# Patient Record
Sex: FEMALE | Race: WHITE | Employment: OTHER | URBAN - METROPOLITAN AREA
[De-identification: names, ages, dates, MRNs, and addresses within clinical notes are randomized per-mention and may not be internally consistent; named-entity substitution may affect disease eponyms.]

---

## 2017-06-21 ENCOUNTER — HOSPITAL ENCOUNTER (OUTPATIENT)
Dept: MAMMOGRAPHY | Age: 67
Discharge: HOME | End: 2017-06-21
Attending: OBSTETRICS & GYNECOLOGY
Payer: COMMERCIAL

## 2017-06-21 DIAGNOSIS — Z12.39 SCREENING FOR MALIGNANT NEOPLASM OF BREAST: ICD-10-CM

## 2017-06-21 PROCEDURE — 77067 SCR MAMMO BI INCL CAD: CPT

## 2019-01-23 ENCOUNTER — HOSPITAL ENCOUNTER (OUTPATIENT)
Dept: MAMMOGRAPHY | Age: 69
Discharge: HOME | End: 2019-01-23
Attending: FAMILY MEDICINE
Payer: MEDICARE

## 2019-01-23 DIAGNOSIS — Z12.31 SCREENING MAMMOGRAM, ENCOUNTER FOR: ICD-10-CM

## 2019-01-23 PROCEDURE — 77067 SCR MAMMO BI INCL CAD: CPT

## 2019-06-14 ENCOUNTER — HOSPITAL ENCOUNTER (OUTPATIENT)
Dept: GENERAL RADIOLOGY | Age: 69
Discharge: HOME | End: 2019-06-14
Payer: MEDICARE

## 2019-06-14 DIAGNOSIS — M54.2 NECK PAIN: ICD-10-CM

## 2019-06-14 PROCEDURE — 72050 X-RAY EXAM NECK SPINE 4/5VWS: CPT

## 2020-03-19 ENCOUNTER — HOSPITAL ENCOUNTER (OUTPATIENT)
Dept: MAMMOGRAPHY | Age: 70
Discharge: HOME | End: 2020-03-19
Attending: INTERNAL MEDICINE
Payer: MEDICARE

## 2020-03-19 DIAGNOSIS — Z12.31 SCREENING MAMMOGRAM, ENCOUNTER FOR: ICD-10-CM

## 2020-03-19 PROCEDURE — 77067 SCR MAMMO BI INCL CAD: CPT

## 2021-04-25 ENCOUNTER — HOSPITAL ENCOUNTER (EMERGENCY)
Age: 71
Discharge: HOME | End: 2021-04-25
Attending: EMERGENCY MEDICINE
Payer: MEDICARE

## 2021-04-25 VITALS
HEIGHT: 61 IN | WEIGHT: 129 LBS | SYSTOLIC BLOOD PRESSURE: 154 MMHG | DIASTOLIC BLOOD PRESSURE: 76 MMHG | RESPIRATION RATE: 16 BRPM | BODY MASS INDEX: 24.35 KG/M2 | HEART RATE: 77 BPM | OXYGEN SATURATION: 100 % | TEMPERATURE: 98.1 F

## 2021-04-25 DIAGNOSIS — B34.9 VIRAL ILLNESS: Primary | ICD-10-CM

## 2021-04-25 LAB
ALBUMIN SERPL-MCNC: 3.4 G/DL (ref 3.2–4.6)
ALBUMIN/GLOB SERPL: 0.9 {RATIO} (ref 1.2–3.5)
ALP SERPL-CCNC: 82 U/L (ref 50–130)
ALT SERPL-CCNC: 15 U/L (ref 12–65)
ANION GAP SERPL CALC-SCNC: 4 MMOL/L (ref 7–16)
AST SERPL-CCNC: 14 U/L (ref 15–37)
BASOPHILS # BLD: 0 K/UL (ref 0–0.2)
BASOPHILS NFR BLD: 1 % (ref 0–2)
BILIRUB SERPL-MCNC: 0.3 MG/DL (ref 0.2–1.1)
BUN SERPL-MCNC: 15 MG/DL (ref 8–23)
CALCIUM SERPL-MCNC: 8.2 MG/DL (ref 8.3–10.4)
CHLORIDE SERPL-SCNC: 109 MMOL/L (ref 98–107)
CO2 SERPL-SCNC: 29 MMOL/L (ref 21–32)
COVID-19 RAPID TEST, COVR: NOT DETECTED
CREAT SERPL-MCNC: 0.64 MG/DL (ref 0.6–1)
DIFFERENTIAL METHOD BLD: ABNORMAL
EOSINOPHIL # BLD: 0.1 K/UL (ref 0–0.8)
EOSINOPHIL NFR BLD: 2 % (ref 0.5–7.8)
ERYTHROCYTE [DISTWIDTH] IN BLOOD BY AUTOMATED COUNT: 12.3 % (ref 11.9–14.6)
GLOBULIN SER CALC-MCNC: 3.6 G/DL (ref 2.3–3.5)
GLUCOSE SERPL-MCNC: 88 MG/DL (ref 65–100)
HCT VFR BLD AUTO: 41.4 % (ref 35.8–46.3)
HGB BLD-MCNC: 13.9 G/DL (ref 11.7–15.4)
IMM GRANULOCYTES # BLD AUTO: 0 K/UL (ref 0–0.5)
IMM GRANULOCYTES NFR BLD AUTO: 0 % (ref 0–5)
LYMPHOCYTES # BLD: 1 K/UL (ref 0.5–4.6)
LYMPHOCYTES NFR BLD: 34 % (ref 13–44)
MCH RBC QN AUTO: 31.3 PG (ref 26.1–32.9)
MCHC RBC AUTO-ENTMCNC: 33.6 G/DL (ref 31.4–35)
MCV RBC AUTO: 93.2 FL (ref 79.6–97.8)
MONOCYTES # BLD: 0.3 K/UL (ref 0.1–1.3)
MONOCYTES NFR BLD: 10 % (ref 4–12)
NEUTS SEG # BLD: 1.6 K/UL (ref 1.7–8.2)
NEUTS SEG NFR BLD: 52 % (ref 43–78)
NRBC # BLD: 0 K/UL (ref 0–0.2)
PLATELET # BLD AUTO: 227 K/UL (ref 150–450)
PMV BLD AUTO: 9.8 FL (ref 9.4–12.3)
POTASSIUM SERPL-SCNC: 3.6 MMOL/L (ref 3.5–5.1)
PROT SERPL-MCNC: 7 G/DL (ref 6.3–8.2)
RBC # BLD AUTO: 4.44 M/UL (ref 4.05–5.2)
SARS-COV-2, COV2: NORMAL
SODIUM SERPL-SCNC: 142 MMOL/L (ref 136–145)
SOURCE, COVRS: NORMAL
TSH SERPL DL<=0.005 MIU/L-ACNC: 4.98 UIU/ML
WBC # BLD AUTO: 3 K/UL (ref 4.3–11.1)

## 2021-04-25 PROCEDURE — U0005 INFEC AGEN DETEC AMPLI PROBE: HCPCS

## 2021-04-25 PROCEDURE — 84443 ASSAY THYROID STIM HORMONE: CPT

## 2021-04-25 PROCEDURE — 87635 SARS-COV-2 COVID-19 AMP PRB: CPT

## 2021-04-25 PROCEDURE — 80053 COMPREHEN METABOLIC PANEL: CPT

## 2021-04-25 PROCEDURE — 99282 EMERGENCY DEPT VISIT SF MDM: CPT

## 2021-04-25 PROCEDURE — 85025 COMPLETE CBC W/AUTO DIFF WBC: CPT

## 2021-04-25 NOTE — DISCHARGE INSTRUCTIONS
Use over-the-counter medicines for symptomatic relief, push fluids, Tylenol Motrin for any fever or body aches, see your primary care physician for routine recheck return to ER for any concerning conditions

## 2021-04-25 NOTE — ED PROVIDER NOTES
Patient to ER complaint of fatigue for the last 2 days. Denies any vomiting no cough or shortness of breath, she has had some mild nausea, no diarrhea constipation, has been has had similar symptoms for the last several days and he has tested negative for Covid. She did not take Covid injection    The history is provided by the patient. Fatigue  This is a new problem. The current episode started 2 days ago. The problem has not changed since onset. There was no focality noted. Pertinent negatives include no focal weakness, no loss of balance, no slurred speech, no speech difficulty, no movement disorder and no disorientation. There has been no fever. Associated symptoms include nausea. Pertinent negatives include no vomiting, no altered mental status and no confusion. There were no medications administered prior to arrival. Associated medical issues do not include trauma or dementia. Past Medical History:   Diagnosis Date    Arthritis     osteo    Fibromyalgia     GERD (gastroesophageal reflux disease)     controlled w/med    Postoperative stiffness of total knee replacement (HCC)     left    S/P total knee replacement using cement 8/15/2013    Thyroid disease     hypothroid. stable w/med       Past Surgical History:   Procedure Laterality Date    HX COLONOSCOPY  9/2015    HX HYSTERECTOMY  1996    HX KNEE REPLACEMENT  2003    partial right knee    HX KNEE REPLACEMENT  2006    complete Right TKA    HX KNEE REPLACEMENT  8/2013    left    HX ORTHOPAEDIC Left 2013    knee replacement    HX ORTHOPAEDIC Right     knee replacement         Family History:   Problem Relation Age of Onset    Breast Cancer Mother 79    Breast Cancer Paternal Aunt 39    Other Sister         fibromyalgia    Heart Disease Brother     Malignant Hyperthermia Neg Hx     Pseudocholinesterase Deficiency Neg Hx     Delayed Awakening Neg Hx     Post-op Nausea/Vomiting Neg Hx     Emergence Delirium Neg Hx     Post-op Cognitive Dysfunction Neg Hx        Social History     Socioeconomic History    Marital status:      Spouse name: Not on file    Number of children: Not on file    Years of education: Not on file    Highest education level: Not on file   Occupational History    Not on file   Social Needs    Financial resource strain: Not on file    Food insecurity     Worry: Not on file     Inability: Not on file    Transportation needs     Medical: Not on file     Non-medical: Not on file   Tobacco Use    Smoking status: Never Smoker    Smokeless tobacco: Never Used   Substance and Sexual Activity    Alcohol use: No     Alcohol/week: 0.0 standard drinks    Drug use: No    Sexual activity: Not on file   Lifestyle    Physical activity     Days per week: Not on file     Minutes per session: Not on file    Stress: Not on file   Relationships    Social connections     Talks on phone: Not on file     Gets together: Not on file     Attends Cheondoism service: Not on file     Active member of club or organization: Not on file     Attends meetings of clubs or organizations: Not on file     Relationship status: Not on file    Intimate partner violence     Fear of current or ex partner: Not on file     Emotionally abused: Not on file     Physically abused: Not on file     Forced sexual activity: Not on file   Other Topics Concern    Not on file   Social History Narrative    Not on file         ALLERGIES: Patient has no known allergies. Review of Systems   Constitutional: Positive for fatigue. Gastrointestinal: Positive for nausea. Negative for vomiting. Neurological: Negative for focal weakness, speech difficulty and loss of balance. Psychiatric/Behavioral: Negative for confusion. All other systems reviewed and are negative.       Vitals:    04/25/21 1133   BP: (!) 147/84   Pulse: 77   Resp: 16   Temp: 98.1 °F (36.7 °C)   SpO2: 98%   Weight: 58.5 kg (129 lb)   Height: 5' 1\" (1.549 m)            Physical Exam  Vitals signs and nursing note reviewed. Constitutional:       General: She is not in acute distress. Appearance: Normal appearance. She is well-developed and normal weight. She is not diaphoretic. HENT:      Head: Normocephalic and atraumatic. Right Ear: Tympanic membrane normal.      Left Ear: Tympanic membrane normal.      Nose: Nose normal.   Eyes:      Pupils: Pupils are equal, round, and reactive to light. Neck:      Musculoskeletal: Normal range of motion and neck supple. Cardiovascular:      Rate and Rhythm: Normal rate and regular rhythm. Pulmonary:      Effort: Pulmonary effort is normal.      Breath sounds: Normal breath sounds. No wheezing or rhonchi. Abdominal:      General: Abdomen is flat. Bowel sounds are normal.      Palpations: Abdomen is soft. Tenderness: There is no abdominal tenderness. Hernia: No hernia is present. Musculoskeletal: Normal range of motion. Skin:     General: Skin is warm. Neurological:      Mental Status: She is alert and oriented to person, place, and time. Psychiatric:         Mood and Affect: Mood normal.         Behavior: Behavior normal.          MDM  Number of Diagnoses or Management Options  Diagnosis management comments: Cbc, cmp, normal, Covid negative, feel illness may be viral in nature especially since  have similar symptoms and has also tested negative for Covid.   Patient is to use over-the-counter medicines for symptomatic relief see primary care for routine recheck       Amount and/or Complexity of Data Reviewed  Clinical lab tests: ordered and reviewed    Risk of Complications, Morbidity, and/or Mortality  Presenting problems: low  Diagnostic procedures: low  Management options: low    Patient Progress  Patient progress: improved         Procedures

## 2021-04-25 NOTE — ED TRIAGE NOTES
Pt states she has been feeling weak with a headache and pain in eyes since yesterday. Denies vomiting or a cough. States she does have some nausea. Masked for triage.

## 2021-04-25 NOTE — ED NOTES
I have reviewed discharge instructions with the patient. The patient verbalized understanding. Patient left ED via Discharge Method: ambulatory to Home with (family). Opportunity for questions and clarification provided. Patient given 0 scripts. No esign       To continue your aftercare when you leave the hospital, you may receive an automated call from our care team to check in on how you are doing. This is a free service and part of our promise to provide the best care and service to meet your aftercare needs.  If you have questions, or wish to unsubscribe from this service please call [phone]. Thank you for Choosing our [address] [place] [address] Emergency Department.

## 2021-04-26 LAB
SARS-COV-2, COV2: NOT DETECTED
SPECIMEN SOURCE, FCOV2M: NORMAL

## 2021-05-29 ENCOUNTER — HOSPITAL ENCOUNTER (OUTPATIENT)
Dept: MRI IMAGING | Age: 71
Discharge: HOME | End: 2021-05-29
Attending: FAMILY MEDICINE
Payer: MEDICARE

## 2021-05-29 DIAGNOSIS — R41.3 MEMORY DEFICIT: ICD-10-CM

## 2021-05-29 PROCEDURE — A9576 INJ PROHANCE MULTIPACK: HCPCS | Performed by: FAMILY MEDICINE

## 2021-05-29 PROCEDURE — 70553 MRI BRAIN STEM W/O & W/DYE: CPT

## 2021-05-29 PROCEDURE — 74011636320 HC RX REV CODE- 636/320: Performed by: FAMILY MEDICINE

## 2021-05-29 RX ORDER — SODIUM CHLORIDE 0.9 % (FLUSH) 0.9 %
10 SYRINGE (ML) INJECTION
Status: COMPLETED | OUTPATIENT
Start: 2021-05-29 | End: 2021-05-29

## 2021-05-29 RX ADMIN — GADOTERIDOL 13 ML: 279.3 INJECTION, SOLUTION INTRAVENOUS at 08:22

## 2021-05-29 RX ADMIN — Medication 10 ML: at 08:22

## 2022-02-03 ENCOUNTER — NURSE TRIAGE (OUTPATIENT)
Dept: OTHER | Facility: CLINIC | Age: 72
End: 2022-02-03

## 2022-02-03 NOTE — TELEPHONE ENCOUNTER
Received call from [facility] at [facility] with [employer]. Subjective: Caller states L breast swelling inside edge  Cyst aspirated in the past    Current Symptoms:   New med started in Dec 2020 - memantine 10mg  Swelling noted on inner L breast on edge  Denies discoloration or discharge from nipple  Denies breast ca hx    Onset: 1 month ago; gradual onset    Associated Symptoms: NA    Pain Severity: no pain    Temperature: 98.1 by forehead thermometer    What has been tried: NA    LMP: NA Pregnant: NA    Recommended disposition: Be seen in office in 3 days    Care advice provided, patient verbalizes understanding; denies any other questions or concerns; instructed to call back for any new or worsening symptoms. Writer provided warm transfer to [place] at [facility] for appointment scheduling    Attention Provider: Thank you for allowing me to participate in the care of your patient. The patient was connected to triage in response to information provided to the ECC. Please do not respond through this encounter as the response is not directed to a shared pool.     Reason for Disposition   Change in shape or appearance of breast    Protocols used: BREAST SYMPTOMS-ADULT-[place]

## 2022-03-03 ENCOUNTER — HOSPITAL ENCOUNTER (OUTPATIENT)
Dept: MAMMOGRAPHY | Age: 72
Discharge: HOME | End: 2022-03-03
Attending: FAMILY MEDICINE
Payer: MEDICARE

## 2022-03-03 ENCOUNTER — HOSPITAL ENCOUNTER (OUTPATIENT)
Dept: MAMMOGRAPHY | Age: 72
Discharge: HOME | End: 2022-03-03
Attending: NURSE PRACTITIONER
Payer: MEDICARE

## 2022-03-03 DIAGNOSIS — Z80.3 FAMILY HISTORY OF BREAST CANCER: ICD-10-CM

## 2022-03-03 DIAGNOSIS — N63.0 BREAST SWELLING: ICD-10-CM

## 2022-03-03 PROCEDURE — 76642 ULTRASOUND BREAST LIMITED: CPT

## 2022-03-03 PROCEDURE — 77066 DX MAMMO INCL CAD BI: CPT

## 2022-07-03 ENCOUNTER — HOSPITAL ENCOUNTER (EMERGENCY)
Age: 72
Discharge: HOME | End: 2022-07-03
Attending: EMERGENCY MEDICINE
Payer: MEDICARE

## 2022-07-03 ENCOUNTER — HOSPITAL ENCOUNTER (EMERGENCY)
Dept: GENERAL RADIOLOGY | Age: 72
Discharge: HOME | End: 2022-07-06
Payer: MEDICARE

## 2022-07-03 VITALS
BODY MASS INDEX: 26.43 KG/M2 | HEIGHT: 61 IN | OXYGEN SATURATION: 97 % | HEART RATE: 86 BPM | SYSTOLIC BLOOD PRESSURE: 156 MMHG | TEMPERATURE: 98.6 F | DIASTOLIC BLOOD PRESSURE: 88 MMHG | WEIGHT: 140 LBS | RESPIRATION RATE: 16 BRPM

## 2022-07-03 DIAGNOSIS — U07.1 COVID-19 VIRUS INFECTION: Primary | ICD-10-CM

## 2022-07-03 LAB
ALBUMIN SERPL-MCNC: 3.4 G/DL (ref 3.2–4.6)
ALBUMIN/GLOB SERPL: 0.8 {RATIO} (ref 1.2–3.5)
ALP SERPL-CCNC: 79 U/L (ref 50–130)
ALT SERPL-CCNC: 22 U/L (ref 12–65)
ANION GAP SERPL CALC-SCNC: 10 MMOL/L (ref 7–16)
AST SERPL-CCNC: 18 U/L (ref 15–37)
BASOPHILS # BLD: 0 K/UL (ref 0–0.2)
BASOPHILS NFR BLD: 0 % (ref 0–2)
BILIRUB SERPL-MCNC: 0.4 MG/DL (ref 0.2–1.1)
BUN SERPL-MCNC: 20 MG/DL (ref 8–23)
CALCIUM SERPL-MCNC: 9.2 MG/DL (ref 8.3–10.4)
CHLORIDE SERPL-SCNC: 105 MMOL/L (ref 98–107)
CO2 SERPL-SCNC: 23 MMOL/L (ref 21–32)
CREAT SERPL-MCNC: 0.83 MG/DL (ref 0.6–1)
DIFFERENTIAL METHOD BLD: ABNORMAL
EOSINOPHIL # BLD: 0 K/UL (ref 0–0.8)
EOSINOPHIL NFR BLD: 0 % (ref 0.5–7.8)
ERYTHROCYTE [DISTWIDTH] IN BLOOD BY AUTOMATED COUNT: 13.2 % (ref 11.9–14.6)
GLOBULIN SER CALC-MCNC: 4.2 G/DL (ref 2.3–3.5)
GLUCOSE SERPL-MCNC: 127 MG/DL (ref 65–100)
HCT VFR BLD AUTO: 43 % (ref 35.8–46.3)
HGB BLD-MCNC: 14.8 G/DL (ref 11.7–15.4)
IMM GRANULOCYTES # BLD AUTO: 0 K/UL (ref 0–0.5)
IMM GRANULOCYTES NFR BLD AUTO: 0 % (ref 0–5)
LYMPHOCYTES # BLD: 1.4 K/UL (ref 0.5–4.6)
LYMPHOCYTES NFR BLD: 26 % (ref 13–44)
MCH RBC QN AUTO: 30.8 PG (ref 26.1–32.9)
MCHC RBC AUTO-ENTMCNC: 34.4 G/DL (ref 31.4–35)
MCV RBC AUTO: 89.6 FL (ref 79.6–97.8)
MONOCYTES # BLD: 0.6 K/UL (ref 0.1–1.3)
MONOCYTES NFR BLD: 12 % (ref 4–12)
NEUTS SEG # BLD: 3.3 K/UL (ref 1.7–8.2)
NEUTS SEG NFR BLD: 62 % (ref 43–78)
NRBC # BLD: 0 K/UL (ref 0–0.2)
PLATELET # BLD AUTO: 290 K/UL (ref 150–450)
PMV BLD AUTO: 9.8 FL (ref 9.4–12.3)
POTASSIUM SERPL-SCNC: 3.1 MMOL/L (ref 3.5–5.1)
PROT SERPL-MCNC: 7.6 G/DL (ref 6.3–8.2)
RBC # BLD AUTO: 4.8 M/UL (ref 4.05–5.2)
SODIUM SERPL-SCNC: 138 MMOL/L (ref 136–145)
WBC # BLD AUTO: 5.3 K/UL (ref 4.3–11.1)

## 2022-07-03 PROCEDURE — 80053 COMPREHEN METABOLIC PANEL: CPT

## 2022-07-03 PROCEDURE — 85025 COMPLETE CBC W/AUTO DIFF WBC: CPT

## 2022-07-03 PROCEDURE — 71046 X-RAY EXAM CHEST 2 VIEWS: CPT

## 2022-07-03 PROCEDURE — 99284 EMERGENCY DEPT VISIT MOD MDM: CPT

## 2022-07-03 ASSESSMENT — ENCOUNTER SYMPTOMS
SHORTNESS OF BREATH: 1
BACK PAIN: 0
NAUSEA: 0
SPUTUM PRODUCTION: 0
VOICE CHANGE: 0
EYE REDNESS: 0
VOMITING: 0
ABDOMINAL PAIN: 0
COUGH: 1
COLOR CHANGE: 0

## 2022-07-03 ASSESSMENT — PAIN - FUNCTIONAL ASSESSMENT: PAIN_FUNCTIONAL_ASSESSMENT: 0-10

## 2022-07-03 ASSESSMENT — PAIN DESCRIPTION - LOCATION: LOCATION: CHEST

## 2022-07-03 ASSESSMENT — PAIN SCALES - GENERAL: PAINLEVEL_OUTOF10: 5

## 2022-07-04 NOTE — ED TRIAGE NOTES
Pt had a positive home Covid test on Wednesday. Pt stating she is having SOB and intermittent chest pain with cough.

## 2022-07-04 NOTE — ED PROVIDER NOTES
[facility] Emergency Department Provider Note                   PCP:                Mitesh Goodwin MD               Age: 73 y.o. Sex: female     No diagnosis found. DISPOSITION         New Prescriptions    No medications on file       Orders Placed This Encounter   Procedures    XR CHEST (2 VW)    CBC with Auto Differential    Comprehensive Metabolic Panel    Insert peripheral IV        Yasmany Worrell MD 10:34 PM      MDM  Number of Diagnoses or Management Options  Diagnosis management comments: Patient is well-appearing here. Not hypoxic. Plan for screening labs and chest x-ray. 10:37 PM  Chest x-ray is clear. Patient's oxygen saturations are 96 to 97% on room air. Discussed results of testing with patient's  at bedside. I feel she stable for discharge and outpatient follow-up       Amount and/or Complexity of Data Reviewed  Clinical lab tests: ordered and reviewed  Tests in the radiology section of CPT®: ordered and reviewed    Risk of Complications, Morbidity, and/or Mortality  Presenting problems: moderate  Diagnostic procedures: moderate  Management options: moderate         Chester Romero is a 73 y.o. female who presents to the Emergency Department with chief complaint of    Chief Complaint   Patient presents with    Shortness of Breath    Positive For Covid-19      Patient presents the ER with  with concerns about intermittent episodes of shortness of breath and some chest discomfort. Patient does have some dementia.  reports she was diagnosed with COVID on Wednesday. Has had some cough. States the past couple days she has had complaints of some intermittent shortness of breath and pain in her chest with coughing. Denies any vomiting. States fevers have resolved. The history is provided by the patient and the spouse.    Shortness of Breath  Severity:  Mild  Timing:  Intermittent  Chronicity:  New  Relieved by:  Nothing  Worsened by: Coughing  Associated symptoms: cough    Associated symptoms: no abdominal pain, no chest pain, no neck pain, no sputum production and no vomiting          Review of Systems   Constitutional: Negative for fatigue and unexpected weight change. HENT: Negative for congestion, dental problem and voice change. Eyes: Negative for redness and visual disturbance. Respiratory: Positive for cough and shortness of breath. Negative for sputum production. Cardiovascular: Negative for chest pain and palpitations. Gastrointestinal: Negative for abdominal pain, nausea and vomiting. Endocrine: Negative for polyphagia and polyuria. Genitourinary: Negative for flank pain and frequency. Musculoskeletal: Negative for back pain, gait problem, myalgias and neck pain. Skin: Negative for color change and pallor. Neurological: Negative for tremors, syncope, weakness, light-headedness and numbness. Hematological: Negative for adenopathy. Does not bruise/bleed easily. Psychiatric/Behavioral: Negative for behavioral problems and confusion. All other systems reviewed and are negative. Past Medical History:   Diagnosis Date    Arthritis     osteo    Fibromyalgia     GERD (gastroesophageal reflux disease)     controlled w/med    Postoperative stiffness of total knee replacement (HCC)     left    S/P total knee replacement using cement 8/15/2013    Thyroid disease     hypothroid. stable w/med        Past Surgical History:   Procedure Laterality Date    COLONOSCOPY  9/2015    HYSTERECTOMY  1996    ORTHOPEDIC SURGERY Right     knee replacement    ORTHOPEDIC SURGERY Left 2013    knee replacement    TOTAL KNEE ARTHROPLASTY  8/2013    left    TOTAL KNEE ARTHROPLASTY  2006    complete Right TKA    TOTAL KNEE ARTHROPLASTY  2003    partial right knee        Family History   Problem Relation Age of Onset    Breast Cancer Mother 79    Breast Cancer Paternal Aunt 39    Post-op Cognitive Dysfunction Neg Hx     Emergence Delirium Neg Hx     Post-op Nausea/Vomiting Neg Hx     Delayed Awakening Neg Hx     Pseudochol. Deficiency Neg Hx     Malig Hypertherm Neg Hx     Other Sister         fibromyalgia    Heart Disease Brother            Social Connections:     Frequency of Communication with Friends and Family: Not on file    Frequency of Social Gatherings with Friends and Family: Not on file    Attends Orthodox Services: Not on file    Active Member of Clubs or Organizations: Not on file    Attends Club or Organization Meetings: Not on file    Marital Status: Not on file        No Known Allergies     Vitals signs and nursing note reviewed. Patient Vitals for the past 4 hrs:   Temp Pulse Resp BP SpO2   07/03/22 2033 98.8 °F (37.1 °C) 93 20 131/79 94 %          Physical Exam  Vitals and nursing note reviewed. Constitutional:       General: She is not in acute distress. Appearance: Normal appearance. She is not ill-appearing. HENT:      Head: Normocephalic and atraumatic. Right Ear: External ear normal.      Left Ear: External ear normal.      Nose: Nose normal. No congestion or rhinorrhea. Mouth/Throat:      Mouth: Mucous membranes are moist.      Pharynx: No oropharyngeal exudate or posterior oropharyngeal erythema. Eyes:      General:         Right eye: No discharge. Left eye: No discharge. Extraocular Movements: Extraocular movements intact. Pupils: Pupils are equal, round, and reactive to light. Cardiovascular:      Rate and Rhythm: Normal rate and regular rhythm. Pulses: Normal pulses. Heart sounds: Normal heart sounds. Pulmonary:      Effort: Pulmonary effort is normal.      Breath sounds: Normal breath sounds. No rales. Chest:      Chest wall: No tenderness. Abdominal:      General: Abdomen is flat. There is no distension. Palpations: There is no mass. Hernia: No hernia is present.    Musculoskeletal:         General: No swelling or tenderness. Normal range of motion. Cervical back: Normal range of motion and neck supple. Skin:     General: Skin is warm. Capillary Refill: Capillary refill takes less than 2 seconds. Coloration: Skin is not jaundiced or pale. Findings: No bruising or erythema. Neurological:      General: No focal deficit present. Mental Status: She is alert and oriented to person, place, and time. Cranial Nerves: No cranial nerve deficit. Psychiatric:         Mood and Affect: Mood normal.         Behavior: Behavior normal.          Procedures      Labs Reviewed   CBC WITH AUTO DIFFERENTIAL - Abnormal; Notable for the following components:       Result Value    Eosinophils % 0 (*)     All other components within normal limits   COMPREHENSIVE METABOLIC PANEL - Abnormal; Notable for the following components:    Potassium 3.1 (*)     Glucose 127 (*)     Globulin 4.2 (*)     Albumin/Globulin Ratio 0.8 (*)     All other components within normal limits        XR CHEST (2 VW)   Final Result      1. No radiographic evidence of pneumonia or pulmonary edema. Voice dictation software was used during the making of this note. This software is not perfect and grammatical and other typographical errors may be present. This note has not been completely proofread for errors.      Isabel Pacheco MD  07/03/22 6574

## 2022-07-05 ENCOUNTER — CARE COORDINATION (OUTPATIENT)
Dept: CARE COORDINATION | Facility: CLINIC | Age: 72
End: 2022-07-05

## 2022-07-05 NOTE — CARE COORDINATION
Patient contacted regarding COVID-19 diagnosis and ED visit on 7/3/2022 for  and positive for covid 19 home test  Discussed COVID-19 related testing which was home test was POSITIVE at this time. Test results were positive. Patient informed of results, if available? Yes. Ambulatory Care Manager contacted the patient by telephone to perform post discharge assessment. Call within 2 business days of discharge: Yes. Verified name and  with patient as identifiers. Provided introduction to self, and explanation of the CTN/ACM role, and reason for call due to risk factors for infection and/or exposure to COVID-19. Symptoms reviewed with patient who verbalized the following symptoms: no worsening symptoms. Due to no new or worsening symptoms encounter was not routed to provider for escalation. Discussed follow-up appointments. If no appointment was previously scheduled, appointment scheduling offered: No.  [facility] follow up appointment(s):   Future Appointments   Date Time Provider Rolanda Alfaro   2022  1:00 PM Keya Hill MD [facility] AMB   2022  9:30 AM Adele Chappell DO [facility]     Non-[facility] follow up appointment(s):      Non-face-to-face services provided:  Obtained and reviewed discharge summary and/or continuity of care documents     Advance Care Planning:   Does patient have an Advance Directive:  not on file. Educated patient about risk for severe COVID-19 due to risk factors according to CDC guidelines. ACM reviewed discharge instructions, medical action plan and red flag symptoms with the patient who verbalized understanding. Discussed COVID vaccination status: Yes. Education provided on COVID-19 vaccination as appropriate. Discussed exposure protocols and quarantine with CDC Guidelines. Patient was given an opportunity to verbalize any questions and concerns and agrees to contact ACM or health care provider for questions related to their healthcare.     Reviewed and educated patient on any new and changed medications related to discharge diagnosis     Was patient discharged with a pulse oximeter? no      ACM provided contact information. Plan for follow-up call in 5-7 days based on severity of symptoms and risk factors.

## 2022-07-06 ENCOUNTER — NURSE TRIAGE (OUTPATIENT)
Dept: OTHER | Facility: CLINIC | Age: 72
End: 2022-07-06

## 2022-07-06 NOTE — TELEPHONE ENCOUNTER
Received call from Sheri Mcgovern at [facility] with [employer]. Subjective: Caller states \"has intermittent chest pains. Has covid\"     Current Symptoms: diagnosed with covid last Wednesday. Went to ED on 7/3 for chest pains. Chest xray done and was clear. Blood work looked fine. Was told to follow up with PCP. Symptoms are the same. Nothing is worse. Calling to schedule follow up    Recommended disposition: Follow up with PCP per ED    Care advice provided, patient verbalizes understanding; denies any other questions or concerns; instructed to call back for any new or worsening symptoms. Patient/Caller agrees with recommended disposition; writer provided warm transfer to [facility] at [facility] for appointment scheduling     Attention Provider: Thank you for allowing me to participate in the care of your patient. The patient was connected to triage in response to information provided to the ECC/PSC. Please do not respond through this encounter as the response is not directed to a shared pool.     Reason for Disposition   Caller has already spoken with the PCP (or office), and has no further questions    Protocols used: NO CONTACT OR DUPLICATE CONTACT CALL-ADULT-[place]

## 2022-07-14 ENCOUNTER — CARE COORDINATION (OUTPATIENT)
Dept: CARE COORDINATION | Facility: CLINIC | Age: 72
End: 2022-07-14

## 2022-07-14 NOTE — CARE COORDINATION
You Patient resolved from the Care Transitions episode on 7/14/2022  Patient contacted regarding f/u call for COVID-19 diagnosis and ED visit on 7/3/2022 for  and positive for covid 19 home test  Discussed COVID-19 related testing which was available at this time. Test results were positive. Patient informed of results, if available? Yes    Patient/family has been provided the following resources and education related to COVID-19:                         Signs, symptoms and red flags related to COVID-19            CDC exposure and quarantine guidelines            [employer] exposure contact - [phone]            Contact for their local Department of Health                 Patient currently reports that the following symptoms have improved:  no further symptoms or intermittent chest pains at this time   Patient is doing good at this time and no further outreaches are scheduled at this time   No further outreach scheduled with this CTN/ACM. Episode of Care resolved. Patient has this CTN/ACM contact information if future needs arise.     Nancy Cuellar RN, BSN, [facility] [phone]

## 2022-11-02 ENCOUNTER — NURSE TRIAGE (OUTPATIENT)
Dept: OTHER | Facility: CLINIC | Age: 72
End: 2022-11-02

## 2022-11-02 NOTE — TELEPHONE ENCOUNTER
Location of patient: [place]    Received call from Thalia Beaver at [facility] with [employer]. Subjective: Caller states \"Throat and chest pain after eating\"     Current Symptoms: Intermittent pain in throat to high-chest after eating     Dx with reflux in 2013, has not taken medication in the past for this    Denies vomiting or diarrhea, denies sour taste in mouth    Denies palpitations, denies pain shoulder, jaw or arm. Pain is not worse with exertion    Onset: Several years. Back from vacation on Monday, ramped up while on vacation    Associated Symptoms: reduced activity    Pain Severity: 8/10    Temperature: Denies    What has been tried: Better when sitting up and leaning forward  Pepto bismol-does not help    LMP: NA Pregnant: NA    Recommended disposition: See in Office Today    Care advice provided, patient verbalizes understanding; denies any other questions or concerns; instructed to call back for any new or worsening symptoms. Patient/Caller agrees with recommended disposition; writer provided warm transfer to [facility] at [facility] for appointment scheduling    Attention Provider: Thank you for allowing me to participate in the care of your patient. The patient was connected to triage in response to information provided to the ECC/PSC. Please do not respond through this encounter as the response is not directed to a shared pool.       Reason for Disposition   All other patients with chest pain (Exception: fleeting chest pain lasting a few seconds)    Protocols used: Chest Pain-ADULT-[place]

## 2022-12-13 ENCOUNTER — OFFICE VISIT (OUTPATIENT)
Dept: FAMILY MEDICINE CLINIC | Facility: CLINIC | Age: 72
End: 2022-12-13
Payer: MEDICARE

## 2022-12-13 VITALS
SYSTOLIC BLOOD PRESSURE: 132 MMHG | HEIGHT: 59 IN | OXYGEN SATURATION: 96 % | HEART RATE: 91 BPM | TEMPERATURE: 97.5 F | WEIGHT: 142.7 LBS | DIASTOLIC BLOOD PRESSURE: 86 MMHG | BODY MASS INDEX: 28.77 KG/M2

## 2022-12-13 DIAGNOSIS — I10 ESSENTIAL (PRIMARY) HYPERTENSION: ICD-10-CM

## 2022-12-13 DIAGNOSIS — Z00.00 ENCOUNTER FOR ANNUAL WELLNESS VISIT (AWV) IN MEDICARE PATIENT: Primary | ICD-10-CM

## 2022-12-13 DIAGNOSIS — E28.39 ESTROGEN DEFICIENCY: ICD-10-CM

## 2022-12-13 DIAGNOSIS — R41.3 MEMORY LOSS: ICD-10-CM

## 2022-12-13 DIAGNOSIS — E03.9 ACQUIRED HYPOTHYROIDISM: ICD-10-CM

## 2022-12-13 DIAGNOSIS — Z12.31 SCREENING MAMMOGRAM, ENCOUNTER FOR: ICD-10-CM

## 2022-12-13 DIAGNOSIS — Z23 FLU VACCINE NEED: ICD-10-CM

## 2022-12-13 DIAGNOSIS — R31.21 ASYMPTOMATIC MICROSCOPIC HEMATURIA: ICD-10-CM

## 2022-12-13 LAB
ALBUMIN SERPL-MCNC: 4 G/DL (ref 3.2–4.6)
ALBUMIN/GLOB SERPL: 1.4 {RATIO} (ref 0.4–1.6)
ALP SERPL-CCNC: 87 U/L (ref 50–136)
ALT SERPL-CCNC: 20 U/L (ref 12–65)
ANION GAP SERPL CALC-SCNC: 5 MMOL/L (ref 2–11)
AST SERPL-CCNC: 17 U/L (ref 15–37)
BASOPHILS # BLD: 0 K/UL (ref 0–0.2)
BASOPHILS NFR BLD: 0 % (ref 0–2)
BILIRUB SERPL-MCNC: 0.4 MG/DL (ref 0.2–1.1)
BILIRUBIN, URINE, POC: ABNORMAL
BLOOD URINE, POC: ABNORMAL
BUN SERPL-MCNC: 13 MG/DL (ref 8–23)
CALCIUM SERPL-MCNC: 10.2 MG/DL (ref 8.3–10.4)
CHLORIDE SERPL-SCNC: 107 MMOL/L (ref 101–110)
CHOLEST SERPL-MCNC: 279 MG/DL
CO2 SERPL-SCNC: 28 MMOL/L (ref 21–32)
CREAT SERPL-MCNC: 0.8 MG/DL (ref 0.6–1)
DIFFERENTIAL METHOD BLD: ABNORMAL
EOSINOPHIL # BLD: 0 K/UL (ref 0–0.8)
EOSINOPHIL NFR BLD: 0 % (ref 0.5–7.8)
ERYTHROCYTE [DISTWIDTH] IN BLOOD BY AUTOMATED COUNT: 13.7 % (ref 11.9–14.6)
GLOBULIN SER CALC-MCNC: 2.9 G/DL (ref 2.8–4.5)
GLUCOSE SERPL-MCNC: 114 MG/DL (ref 65–100)
GLUCOSE URINE, POC: NEGATIVE
HCT VFR BLD AUTO: 42.4 % (ref 35.8–46.3)
HDLC SERPL-MCNC: 109 MG/DL (ref 40–60)
HDLC SERPL: 2.6 {RATIO}
HGB BLD-MCNC: 13.5 G/DL (ref 11.7–15.4)
IMM GRANULOCYTES # BLD AUTO: 0 K/UL (ref 0–0.5)
IMM GRANULOCYTES NFR BLD AUTO: 0 % (ref 0–5)
KETONES, URINE, POC: ABNORMAL
LDLC SERPL CALC-MCNC: 158.4 MG/DL
LEUKOCYTE ESTERASE, URINE, POC: ABNORMAL
LYMPHOCYTES # BLD: 1 K/UL (ref 0.5–4.6)
LYMPHOCYTES NFR BLD: 15 % (ref 13–44)
MCH RBC QN AUTO: 29.8 PG (ref 26.1–32.9)
MCHC RBC AUTO-ENTMCNC: 31.8 G/DL (ref 31.4–35)
MCV RBC AUTO: 93.6 FL (ref 82–102)
MONOCYTES # BLD: 0.4 K/UL (ref 0.1–1.3)
MONOCYTES NFR BLD: 6 % (ref 4–12)
NEUTS SEG # BLD: 5.2 K/UL (ref 1.7–8.2)
NEUTS SEG NFR BLD: 79 % (ref 43–78)
NITRITE, URINE, POC: NEGATIVE
NRBC # BLD: 0 K/UL (ref 0–0.2)
PH, URINE, POC: 6 (ref 4.6–8)
PLATELET # BLD AUTO: 282 K/UL (ref 150–450)
PMV BLD AUTO: 10.5 FL (ref 9.4–12.3)
POTASSIUM SERPL-SCNC: 3.7 MMOL/L (ref 3.5–5.1)
PROT SERPL-MCNC: 6.9 G/DL (ref 6.3–8.2)
PROTEIN,URINE, POC: ABNORMAL
RBC # BLD AUTO: 4.53 M/UL (ref 4.05–5.2)
SODIUM SERPL-SCNC: 140 MMOL/L (ref 133–143)
SPECIFIC GRAVITY, URINE, POC: >=1.03 (ref 1–1.03)
TRIGL SERPL-MCNC: 58 MG/DL (ref 35–150)
TSH, 3RD GENERATION: 10.6 UIU/ML (ref 0.36–3.74)
URINALYSIS CLARITY, POC: CLEAR
URINALYSIS COLOR, POC: YELLOW
UROBILINOGEN, POC: 0.2
VLDLC SERPL CALC-MCNC: 11.6 MG/DL (ref 6–23)
WBC # BLD AUTO: 6.7 K/UL (ref 4.3–11.1)

## 2022-12-13 PROCEDURE — 99214 OFFICE O/P EST MOD 30 MIN: CPT | Performed by: FAMILY MEDICINE

## 2022-12-13 PROCEDURE — 81003 URINALYSIS AUTO W/O SCOPE: CPT | Performed by: FAMILY MEDICINE

## 2022-12-13 PROCEDURE — 90694 VACC AIIV4 NO PRSRV 0.5ML IM: CPT | Performed by: FAMILY MEDICINE

## 2022-12-13 PROCEDURE — 3078F DIAST BP <80 MM HG: CPT | Performed by: FAMILY MEDICINE

## 2022-12-13 PROCEDURE — G0008 ADMIN INFLUENZA VIRUS VAC: HCPCS | Performed by: FAMILY MEDICINE

## 2022-12-13 PROCEDURE — G0439 PPPS, SUBSEQ VISIT: HCPCS | Performed by: FAMILY MEDICINE

## 2022-12-13 PROCEDURE — 3075F SYST BP GE 130 - 139MM HG: CPT | Performed by: FAMILY MEDICINE

## 2022-12-13 PROCEDURE — 1123F ACP DISCUSS/DSCN MKR DOCD: CPT | Performed by: FAMILY MEDICINE

## 2022-12-13 RX ORDER — OMEPRAZOLE 40 MG/1
40 CAPSULE, DELAYED RELEASE ORAL EVERY MORNING
COMMUNITY
Start: 2022-12-05 | End: 2022-12-13 | Stop reason: SDUPTHER

## 2022-12-13 RX ORDER — OMEPRAZOLE 40 MG/1
40 CAPSULE, DELAYED RELEASE ORAL EVERY MORNING
Qty: 90 CAPSULE | Refills: 3 | Status: SHIPPED | OUTPATIENT
Start: 2022-12-13

## 2022-12-13 ASSESSMENT — PATIENT HEALTH QUESTIONNAIRE - PHQ9
SUM OF ALL RESPONSES TO PHQ9 QUESTIONS 1 & 2: 0
SUM OF ALL RESPONSES TO PHQ QUESTIONS 1-9: 0
SUM OF ALL RESPONSES TO PHQ QUESTIONS 1-9: 0
2. FEELING DOWN, DEPRESSED OR HOPELESS: 0
SUM OF ALL RESPONSES TO PHQ QUESTIONS 1-9: 0
1. LITTLE INTEREST OR PLEASURE IN DOING THINGS: 0
SUM OF ALL RESPONSES TO PHQ QUESTIONS 1-9: 0

## 2022-12-13 ASSESSMENT — LIFESTYLE VARIABLES
HOW OFTEN DO YOU HAVE A DRINK CONTAINING ALCOHOL: NEVER
HOW MANY STANDARD DRINKS CONTAINING ALCOHOL DO YOU HAVE ON A TYPICAL DAY: PATIENT DOES NOT DRINK

## 2022-12-13 NOTE — PATIENT INSTRUCTIONS
Learning About Mild Cognitive Impairment (MCI)  What is mild cognitive impairment (MCI)? It's common to forget things sometimes as we get older. But some older people have memory loss that's more than normal aging. It's called mild cognitive impairment, or MCI. It is not the same as dementia. People with the condition often know that their memory or mental function has changed. Tests may show some loss. But their minds work well overall. They can carry out daily tasks that are normal for them. People with MCI have a higher chance of one day getting dementia. But not all people who have it will get dementia. Some people may stay the same over time. What are the symptoms? People with MCI have more memory loss than what occurs with normal aging. They may have increasing trouble with recalling words and keeping up with conversations. They may also have trouble remembering important events and making decisions. What puts you at risk? The risk of getting MCI increases with age. Having high blood pressure or having a family history of MCI may also increase your risk. How is it diagnosed? Your doctor will do a physical exam.  You may be asked questions to check your memory and other mental skills. Your doctor may also talk to close friends and family members. This can help the doctor figure out how your memory and other mental skills have changed. You may get blood tests and tests that look at your brain. These questions and tests can make sure you don't have other conditions that can cause symptoms like MCI. These include depression, sleep problems, and side effects from medicines. How is it treated? There are no medicines to treat MCI or to keep it from progressing to dementia. But treating conditions like high blood pressure and diabetes may help. A person with MCI needs routine follow-up visits with their doctor to check on changes in the person's mental skills.   How can you care for yourself at home?  Keeping your body active can help slow MCI. Exercises like walking can help. Try to stay active mentally too. Read or do things like crossword puzzles if you enjoy doing them. If you need help coping with MCI, you may want to get support from family, friends, a support group, or a counselor who works with people who have [address] Though the future isn't always clear, it can be good to plan ahead with instructions for your care. These are called advanced directives. Having a plan can help make sure that you get the care you want. Current as of: August 25, 2022               Content Version: 13.5  © 2006-2022 [employer]. Care instructions adapted under license by [facility] ([facility]). If you have questions about a medical condition or this instruction, always ask your healthcare professional. [address] any warranty or liability for your use of this information. Learning About Stress  What is stress? Stress is what you feel when you have to handle more than you are used to. Stress is a fact of life for most people, and it affects everyone differently. What causes stress for you may not be stressful for someone else. A lot of things can cause stress. You may feel stress when you go on a job interview, take a test, or run a race. This kind of short-term stress is normal and even useful. It can help you if you need to work hard or react quickly. For example, stress can help you finish an important job on time. Stress also can last a long time. Long-term stress is caused by stressful situations or events. Examples of long-term stress include long-term health problems, ongoing problems at work, or conflicts in your family. Long-term stress can harm your health. How does stress affect your health? When you are stressed, your body responds as though you are in danger. It makes hormones that speed up your heart, make you breathe faster, and give you a burst of energy. This is called the fight-or-flight stress response. If the stress is over quickly, your body goes back to normal and no harm is done. But if stress happens too often or lasts too long, it can have bad effects. Long-term stress can make you more likely to get sick, and it can make symptoms of some diseases worse. If you tense up when you are stressed, you may develop neck, shoulder, or low back pain. Stress is linked to high blood pressure and heart disease. Stress also harms your emotional health. It can make you jo, tense, or depressed. Your relationships may suffer, and you may not do well at work or school. What can you do to manage stress? How to relax your mind   · Write. It may help to write about things that are bothering you. This helps you find out how much stress you feel and what is causing it. When you know this, you can find better ways to cope. · Let your feelings out. Talk, laugh, cry, and express anger when you need to. Talking with friends, family, a counselor, or a member of the clergy about your feelings is a healthy way to relieve stress. · Do something you enjoy. For example, listen to music or go to a movie. Practice your hobby or do volunteer work. · Meditate. This can help you relax, because you are not worrying about what happened before or what may happen in the future. · Do guided imagery. Imagine yourself in any setting that helps you feel calm. You can use audiotapes, books, or a teacher to guide you. How to relax your body   · Do something active. Exercise or activity can help reduce stress. Walking is a great way to get started. Even everyday activities such as housecleaning or yard work can help. · Do breathing exercises. For example:  ? From a standing position, bend forward from the waist with your knees slightly bent. Let your arms dangle close to the floor. ? Breathe in slowly and deeply as you return to a standing position.  Roll up slowly and lift your head last.  ? Hold your breath for just a few seconds in the standing position. ? Breathe out slowly and bend forward from the waist.  · Try yoga or jeanna chi. These techniques combine exercise and meditation. You may need some training at first to learn them. What can you do to prevent stress? · Manage your time. This helps you find time to do the things you want and need to do. · Get enough sleep. Your body recovers from the stresses of the day while you are sleeping. · Get support. Your family, friends, and community can make a difference in how you experience stress. Where can you learn more? Go to http://www.rodriguez.com/ and enter N032 to learn more about \"Learning About Stress. \"  Current as of: October 6, 2021               Content Version: 13.5  © 1363-4905 Healthwise, [employer]. Care instructions adapted under license by [facility] ([facility]). If you have questions about a medical condition or this instruction, always ask your healthcare professional. [address] any warranty or liability for your use of this information. Learning About Being Active as an Older Adult  Why is being active important as you get older? Being active is one of the best things you can do for your health. And it's never too late to start. Being active--or getting active, if you aren't already--has definite benefits. It can:  · Give you more energy,  · Keep your mind sharp. · Improve balance to reduce your risk of falls. · Help you manage chronic illness with fewer medicines. No matter how old you are, how fit you are, or what health problems you have, there is a form of activity that will work for you. And the more physical activity you can do, the better your overall health will be. What kinds of activity can help you stay healthy? Being more active will make your daily activities easier. Physical activity includes planned exercise and things you do in daily life.  There are four types of activity:  Aerobic. Doing aerobic activity makes your heart and lungs strong. · Includes walking, dancing, and gardening. · Aim for at least 2½ hours spread throughout the week. · It improves your energy and can help you sleep better. Muscle-strengthening. This type of activity can help maintain muscle and strengthen bones. · Includes climbing stairs, using resistance bands, and lifting or carrying heavy loads. · Aim for at least twice a week. · It can help protect the knees and other joints. Stretching. Stretching gives you better range of motion in joints and muscles. · Includes upper arm stretches, calf stretches, and gentle yoga. · Aim for at least twice a week, preferably after your muscles are warmed up from other activities. · It can help you function better in daily life. Balancing. This helps you stay coordinated and have good posture. · Includes heel-to-toe walking, jeanna chi, and certain types of yoga. · Aim for at least 3 days a week. · It can reduce your risk of falling. Even if you have a hard time meeting the recommendations, it's better to be more active than less active. All activity done in each category counts toward your weekly total. You'd be surprised how daily things like carrying groceries, keeping up with grandchildren, and taking the stairs can add up. What keeps you from being active? If you've had a hard time being more active, you're not alone. Maybe you remember being able to do more. Or maybe you've never thought of yourself as being active. It's frustrating when you can't do the things you want. Being more active can help. What's holding you back? Getting started. Have a goal, but break it into easy tasks. Small steps build into big accomplishments. Staying motivated. If you feel like skipping your activity, remember your goal. Maybe you want to move better and stay independent. Every activity gets you one step closer.   Not feeling your best.  Start with 5 minutes of an activity you enjoy. Prove to yourself you can do it. As you get comfortable, increase your time. You may not be where you want to be. But you're in the process of getting there. Everyone starts somewhere. How can you find safe ways to stay active? Talk with your doctor about any physical challenges you're facing. Make a plan with your doctor if you have a health problem or aren't sure how to get started with activity. If you're already active, ask your doctor if there is anything you should change to stay safe as your body and health change. If you tend to feel dizzy after you take medicine, avoid activity at that time. Try being active before you take your medicine. This will reduce your risk of falls. If you plan to be active at home, make sure to clear your space before you get started. Remove things like TV cords, coffee tables, and throw rugs. It's safest to have plenty of space to move freely. The key to getting more active is to take it slow and steady. Try to improve only a little bit at a time. Pick just one area to improve on at first. And if an activity hurts, stop and talk to your doctor. Where can you learn more? Go to http://www.rodriguez.com/ and enter P600 to learn more about \"Learning About Being Active as an Older Adult. \"  Current as of: October 10, 2022               Content Version: 13.5  © 2006-2022 Healthwise, Incorporated. Care instructions adapted under license by [facility] ([facility]). If you have questions about a medical condition or this instruction, always ask your healthcare professional. [place] [id number] any warranty or liability for your use of this information. Learning About Dental Care for Older Adults  Dental care for older adults: Overview  Dental care for older people is much the same as for younger adults. But older adults do have concerns that younger adults do not.  Older adults may have problems with gum disease and decay on the roots of their teeth. They may need missing teeth replaced or broken fillings fixed. Or they may have dentures that need to be cared for. Some older adults may have trouble holding a toothbrush. You can help remind the person you are caring for to brush and floss their teeth or to clean their dentures. In some cases, you may need to do the brushing and other dental care tasks. People who have trouble using their hands or who have dementia may need this extra help. How can you help with dental care? Normal dental care  To keep the teeth and gums healthy:  · Brush the teeth with fluoride toothpaste twice a day--in the morning and at night--and floss at least once a day. Plaque can quickly build up on the teeth of older adults. · Watch for the signs of gum disease. These signs include gums that bleed after brushing or after eating hard foods, such as apples. · See a dentist regularly. Many experts recommend checkups every 6 months. · Keep the dentist up to date on any new medications the person is taking. · Encourage a balanced diet that includes whole grains, vegetables, and fruits, and that is low in saturated fat and sodium. · Encourage the person you're caring for not to use tobacco products. They can affect dental and general health. Many older adults have a fixed income and feel that they can't afford dental care. But most towns and [facility] have programs in which dentists help older adults by lowering fees. Contact your area's public health offices or  for information about dental care in your area. Using a toothbrush  Older adults with arthritis sometimes have trouble brushing their teeth because they can't easily hold the toothbrush. Their hands and fingers may be stiff, painful, or weak. If this is the case, you can:  · Offer an electric toothbrush.   · Enlarge the handle of a non-electric toothbrush by wrapping a sponge, an elastic bandage, or adhesive tape around it.  · Push the toothbrush handle through a ball made of rubber or soft foam.  · Make the handle longer and thicker by taping Popsicle sticks or tongue depressors to it. You may also be able to buy special toothbrushes, toothpaste dispensers, and floss holders. Your doctor may recommend a soft-bristle toothbrush if the person you care for bleeds easily. Bleeding can happen because of a health problem or from certain medicines. A toothpaste for sensitive teeth may help if the person you care for has sensitive teeth. How do you brush and floss someone's teeth? If the person you are caring for has a hard time cleaning their teeth on their own, you may need to brush and floss their teeth for them. It may be easiest to have the person sit and face away from you, and to sit or stand behind them. That way you can steady their head against your arm as you reach around to floss and brush their teeth. Choose a place that has good lighting and is comfortable for both of you. Before you begin, gather your supplies. You will need gloves, floss, a toothbrush, and a container to hold water if you are not near a sink. Wash and dry your hands well and put on gloves. Start by flossing:  · Gently work a piece of floss between each of the teeth toward the gums. A plastic flossing tool may make this easier, and they are available at [facility]. · Curve the floss around each tooth into a U-shape and gently slide it under the gum line. · Move the floss firmly up and down several times to scrape off the plaque. After you've finished flossing, throw away the used floss and begin brushing:  · Wet the brush and apply toothpaste. · Place the brush at a 45-degree angle where the teeth meet the gums. Press firmly, and move the brush in small circles over the surface of the teeth. · Be careful not to brush too hard. Vigorous brushing can make the gums pull away from the teeth and can scratch the tooth enamel.   · Brush all surfaces of the teeth, on the tongue side and on the cheek side. Pay special attention to the front teeth and all surfaces of the back teeth. · Brush chewing surfaces with short back-and-forth strokes. After you've finished, help the person rinse the remaining toothpaste from their mouth. Where can you learn more? Go to http://www.woods.com/ and enter F944 to learn more about \"Learning About Dental Care for Older Adults. \"  Current as of: June 16, 2022               Content Version: 13.5  © 9797-1923 Healthwise, [employer]. Care instructions adapted under license by [facility] ([facility]). If you have questions about a medical condition or this instruction, always ask your healthcare professional. [address] any warranty or liability for your use of this information. Learning About Activities of Daily Living  What are activities of daily living? Activities of daily living (ADLs) are the basic self-care tasks you do every day. As you age, and if you have health problems, you may find that it's harder to do these things for yourself. That's when you may need some help. Your doctor uses ADLs to measure how much help you need. Knowing what you can and can't do for yourself is an important first step to getting help. And when you have the help you need, you can stay as independent as possible. Your doctor will want to know if you are able to do tasks such as:  · Take a bath or shower without help. · Go to the bathroom by yourself. · Dress and undress without help. · Shave, comb your hair, and brush teeth on your own. · Get in and out of bed or a chair without help. · Feed yourself without help. If you are having trouble doing basic self-care tasks, talk with your doctor. You may want to bring a caregiver or family member who can help the doctor understand your needs and abilities. How will a doctor assess your ADLs?   Asking about ADLs is part of a routine health checkup your doctor will likely do as you age. Your health check might be done in a doctor's office, in your home, or at a hospital. The goal is to find out if you are having any problems that could make your health problems worse or that make it unsafe for you to be on your own. To measure your ADLs, your doctor will ask how hard it is for you to do routine tasks. He or she may also want to know if you have changed the way you do a task because of a health problem. He or she may watch how you:  · Walk back and forth. · Keep your balance while you stand or walk. · Move from sitting to standing or from a bed to a chair. · Button or unbutton a shirt or sweater. · Remove and put on your shoes. It's normal to feel a little worried or anxious if you find you can't do all the things you used to be able to do. Talking with your doctor about ADLs isn't a test that you either pass or fail. It's just a way to get more information about your health and safety. Follow-up care is a key part of your treatment and safety. Be sure to make and go to all appointments, and call your doctor if you are having problems. It's also a good idea to know your test results and keep a list of the medicines you take. Current as of: October 6, 2021               Content Version: 13.5  © 7951-9905 Healthwise, Incorporated. Care instructions adapted under license by [facility] ([facility]). If you have questions about a medical condition or this instruction, always ask your healthcare professional. [place] [address] any warranty or liability for your use of this information. Advance Directives: Care Instructions  Overview  An advance directive is a legal way to state your wishes at the end of your life. It tells your family and your doctor what to do if you can't say what you want. There are two main types of advance directives. You can change them any time your wishes change. Living will.   This form tells your family and your doctor your wishes about life support and other treatment. The form is also called a declaration. Medical power of . This form lets you name a person to make treatment decisions for you when you can't speak for yourself. This person is called a health care agent (health care proxy, health care surrogate). The form is also called a durable power of  for health care. If you do not have an advance directive, decisions about your medical care may be made by a family member, or by a doctor or a  who doesn't know you. It may help to think of an advance directive as a gift to the people who care for you. If you have one, they won't have to make tough decisions by themselves. For more information, including forms for your state, see the [employer] website (www.caringinfo.org/planning/advance-directives/). Follow-up care is a key part of your treatment and safety. Be sure to make and go to all appointments, and call your doctor if you are having problems. It's also a good idea to know your test results and keep a list of the medicines you take. What should you include in an advance directive? Many states have a unique advance directive form. (It may ask you to address specific issues.) Or you might use a universal form that's approved by many states. If your form doesn't tell you what to address, it may be hard to know what to include in your advance directive. Use the questions below to help you get started. · Who do you want to make decisions about your medical care if you are not able to? · What life-support measures do you want if you have a serious illness that gets worse over time or can't be cured? · What are you most afraid of that might happen? (Maybe you're afraid of having pain, losing your independence, or being kept alive by machines.)  · Where would you prefer to die? (Your home? A hospital? A nursing home?)  · Do you want to donate your organs when you die?   · Do you want certain Christian practices performed before you die? When should you call for help? Be sure to contact your doctor if you have any questions. Where can you learn more? Go to http://www.rodriguez.com/ and enter R264 to learn more about \"Advance Directives: Care Instructions. \"  Current as of: June 16, 2022               Content Version: 13.5  © 8217-4756 [employer]. Care instructions adapted under license by [facility] ([facility]). If you have questions about a medical condition or this instruction, always ask your healthcare professional. [address] any warranty or liability for your use of this information. Personalized Preventive Plan for [place] - 12/13/2022  Medicare offers a range of preventive health benefits. Some of the tests and screenings are paid in full while other may be subject to a deductible, co-insurance, and/or copay. Some of these benefits include a comprehensive review of your medical history including lifestyle, illnesses that may run in your family, and various assessments and screenings as appropriate. After reviewing your medical record and screening and assessments performed today your provider may have ordered immunizations, labs, imaging, and/or referrals for you. A list of these orders (if applicable) as well as your Preventive Care list are included within your After Visit Summary for your review. Other Preventive Recommendations:    A preventive eye exam performed by an eye specialist is recommended every 1-2 years to screen for glaucoma; cataracts, macular degeneration, and other eye disorders. A preventive dental visit is recommended every 6 months. Try to get at least 150 minutes of exercise per week or 10,000 steps per day on a pedometer . Order or download the FREE \"Exercise & Physical Activity: Your Everyday Guide\" from [employer].  Call 6-568.542.3069 or search [employer] online. You need 0408-4362 mg of calcium and 0374-9973 IU of vitamin D per day. It is possible to meet your calcium requirement with diet alone, but a vitamin D supplement is usually necessary to meet this goal.  When exposed to the sun, use a sunscreen that protects against both UVA and UVB radiation with an SPF of 30 or greater. Reapply every 2 to 3 hours or after sweating, drying off with a towel, or swimming. Always wear a seat belt when traveling in a car. Always wear a helmet when riding a bicycle or motorcycle.

## 2022-12-13 NOTE — PROGRESS NOTES
Medicare Annual Wellness Visit    Christopher Schofield is here for Medicare AWV    Assessment & Plan   Encounter for annual wellness visit (AWV) in Medicare patient  Screening mammogram, encounter for  -     HAIM DIGITAL SCREEN W OR WO CAD BILATERAL; Future  Estrogen deficiency  -     DEXA BONE DENSITY AXIAL SKELETON; Future  Acquired hypothyroidism  -     TSH; Future  Memory loss  -     TSH; Future  Essential (primary) hypertension  -     AMB POC URINALYSIS DIP STICK AUTO W/O MICRO  -     Lipid Panel; Future  -     CBC with Auto Differential; Future  -     Comprehensive Metabolic Panel; Future  Flu vaccine need  -     Influenza, FLUAD, (age 72 y+), IM, Preservative Free, 0.5 mL  Asymptomatic microscopic hematuria  -     Culture, Urine; Future      Cont Namenda and neuro f./u  Await tsh and adjust med as needed  Watch BP  UA with Ketones and blood- increase hydration and await UC    Recommendations for Preventive Services Due: see orders and patient instructions/AVS.  Recommended screening schedule for the next 5-10 years is provided to the patient in written form: see Patient Instructions/AVS.     Return for Medicare Annual Wellness Visit in 1 year. Subjective   The following acute and/or chronic problems were also addressed today:  Memory gradually worsening after period of relative stability. Has seen Neurology and on Namenda. Not driving. Has had increased nose bleeds, but improved more recently. Napping a little more. Sleeps well at night. Has been on BP meds in past, but now controlled off meds. Patient's complete Health Risk Assessment and screening values have been reviewed and are found in Flowsheets. The following problems were reviewed today and where indicated follow up appointments were made and/or referrals ordered. Positive Risk Factor Screenings with Interventions:       Cognitive:    Words recalled: 0 Words Recalled   Clock Drawing Test (CDT): (!) Abnormal   Total Score: (!) 0   Total Score Interpretation: Abnormal Mini-Cog      Interventions:  Seeing neuro           General HRA Questions:  Select all that apply: (!) Stress    Stress Interventions:  See above       Weight and Activity:  Physical Activity: Inactive    Days of Exercise per Week: 0 days    Minutes of Exercise per Session: 0 min     On average, how many days per week do you engage in moderate to strenuous exercise (like a brisk walk)?: 0 days  Have you lost any weight without trying in the past 3 months?: No  Body mass index: (!) 28.82    Inactivity Interventions:  Good family support      Dentist Screen:  Have you seen the dentist within the past year?: (!) No    Intervention:  Will f/u with dentist       ADL's:   Patient reports needing help with:  Select all that apply: (!) Food Preparation, Transportation, Taking Medications  Interventions:   is caretaker    Advanced Directives:  Do you have a Living Will?: (!) No    Intervention:                         Objective   Vitals:    12/13/22 0735 12/13/22 0803   BP: (!) 146/88 132/86   Site: Left Upper Arm    Position: Sitting    Cuff Size: Large Adult    Pulse: 91    Temp: 97.5 °F (36.4 °C)    TempSrc: Temporal    SpO2: 96%    Weight: 142 lb 11.2 oz (64.7 kg)    Height: 4' 11\" (1.499 m)       Body mass index is 28.82 kg/m². No Known Allergies  Prior to Visit Medications    Medication Sig Taking?  Authorizing Provider   Multiple Vitamin (MULTIVITAMIN PO) Take by mouth Yes Historical Provider, MD   CALCIUM PO Take by mouth Yes Historical Provider, MD   omeprazole (PRILOSEC) 40 MG delayed release capsule Take 1 capsule by mouth every morning Yes Fortino Perez MD   celecoxib (CELEBREX) 200 MG capsule TAKE 1 CAPSULE BY MOUTH WITH FOOD TWICE A DAY FOR 30 DAYS Yes Ar Automatic Reconciliation   DULoxetine (CYMBALTA) 60 MG extended release capsule Take 60 mg by mouth daily TAKE ONE CAPSULE BY MOUTH EVERY DAY Yes Ar Automatic Reconciliation   ergocalciferol (ERGOCALCIFEROL) 1.25 MG (67176 UT) capsule Take 50,000 Units by mouth once a week Yes Ar Automatic Reconciliation   hydroxychloroquine (PLAQUENIL) 200 MG tablet TAKE 1 TABLET BY MOUTH EVERY DAY WITH FOOD OR MILK Yes Ar Automatic Reconciliation   memantine (NAMENDA) 10 MG tablet Take 10 mg by mouth 2 times daily Take half bid for one month than one bid Yes Ar Automatic Reconciliation   pregabalin (LYRICA) 25 MG capsule Take 25 mg by mouth 2 times daily. Yes Ar Automatic Reconciliation   colestipol (COLESTID) 1 g tablet 2 times daily  Patient not taking: Reported on 12/13/2022  Ar Automatic Reconciliation   LORazepam (ATIVAN) 0.5 MG tablet Take 0.5 mg by mouth.   Patient not taking: Reported on 12/13/2022  Ar Automatic Reconciliation       CareTeam (Including outside providers/suppliers regularly involved in providing care):   Patient Care Team:  Mayra Lee MD as PCP - David Ramsay MD as PCP - [facility] Empaneled Provider     Reviewed and updated this visit:  Tobacco  Allergies  Meds  Problems  Med Hx  Surg Hx  Soc Hx  Fam Hx

## 2022-12-14 DIAGNOSIS — E03.9 ACQUIRED HYPOTHYROIDISM: Primary | ICD-10-CM

## 2022-12-14 RX ORDER — LEVOTHYROXINE SODIUM 0.1 MG/1
100 TABLET ORAL DAILY
Qty: 90 TABLET | Refills: 3 | Status: SHIPPED | OUTPATIENT
Start: 2022-12-14

## 2022-12-15 LAB
BACTERIA SPEC CULT: NORMAL
BACTERIA SPEC CULT: NORMAL
SERVICE CMNT-IMP: NORMAL

## 2022-12-20 ENCOUNTER — OFFICE VISIT (OUTPATIENT)
Dept: NEUROLOGY | Age: 72
End: 2022-12-20
Payer: MEDICARE

## 2022-12-20 VITALS
HEART RATE: 90 BPM | BODY MASS INDEX: 31.43 KG/M2 | DIASTOLIC BLOOD PRESSURE: 80 MMHG | WEIGHT: 155.6 LBS | OXYGEN SATURATION: 99 % | SYSTOLIC BLOOD PRESSURE: 132 MMHG

## 2022-12-20 DIAGNOSIS — R41.3 MEMORY LOSS: Primary | ICD-10-CM

## 2022-12-20 PROCEDURE — 3078F DIAST BP <80 MM HG: CPT | Performed by: PSYCHIATRY & NEUROLOGY

## 2022-12-20 PROCEDURE — 1123F ACP DISCUSS/DSCN MKR DOCD: CPT | Performed by: PSYCHIATRY & NEUROLOGY

## 2022-12-20 PROCEDURE — 99214 OFFICE O/P EST MOD 30 MIN: CPT | Performed by: PSYCHIATRY & NEUROLOGY

## 2022-12-20 PROCEDURE — 3075F SYST BP GE 130 - 139MM HG: CPT | Performed by: PSYCHIATRY & NEUROLOGY

## 2022-12-20 RX ORDER — MEMANTINE HYDROCHLORIDE 10 MG/1
10 TABLET ORAL 2 TIMES DAILY
Qty: 180 TABLET | Refills: 3 | Status: SHIPPED | OUTPATIENT
Start: 2022-12-20

## 2022-12-20 RX ORDER — DONEPEZIL HYDROCHLORIDE 5 MG/1
5 TABLET, FILM COATED ORAL NIGHTLY
Qty: 90 TABLET | Refills: 3 | Status: SHIPPED | OUTPATIENT
Start: 2022-12-20

## 2022-12-20 ASSESSMENT — ENCOUNTER SYMPTOMS
EYES NEGATIVE: 1
RESPIRATORY NEGATIVE: 1
GASTROINTESTINAL NEGATIVE: 1
ALLERGIC/IMMUNOLOGIC NEGATIVE: 1

## 2022-12-20 NOTE — PROGRESS NOTES
[address], [address], [address]  Phone: [phone] Fax [phone]  Dr. Evelia Wetzel      12/20/2022  Florance Black     Patient is referred by the following provider for consultation regarding as below:       I reviewed the available records and notes and have examined patient with the following findings:     Chief Complaint:  Chief Complaint   Patient presents with    Follow-up     Amnesia          HPI: This is a right handed 81 y.o.  female with her . The patient unfortunately has been developing short-term memory loss has been going on for about 6 years progressively getting worse. She stabilized out for the last 2 years but I think she is now progressed since last time I saw her which was about April of this year. Her long-term memory is definitely sharper than her short-term. When she tells stories is usually long-term memory. She does repeat questions within 5 minutes she uses postop notes and calendars. Can only make simple foods for meals. They do live with their son and often she forgets this. She is no longer operating motor vehicle. She is on Namenda 10 twice a day she is on vitamin D she tried [place] main and could not tolerate it but has not tried omega vitamins as of yet. The patient is stable. And they have not noticed a significant change the  does notice memory getting worse. IMAGING REVIEW:  I REVIEWED PERTINENT  IMAGES AND REPORTS WITH THE PATIENT PERSONALLY, DIRECTLY AND FULLY. Past Medical History:  Past Medical History:   Diagnosis Date    Arthritis     osteo    Fibromyalgia     GERD (gastroesophageal reflux disease)     controlled w/med    Postoperative stiffness of total knee replacement (HCC)     left    S/P total knee replacement using cement 8/15/2013    Thyroid disease     hypothroid. stable w/med       Past Surgical History:  Past Surgical History:   Procedure Laterality Date    COLONOSCOPY 9/2015    HYSTERECTOMY (CERVIX STATUS UNKNOWN)  1996    ORTHOPEDIC SURGERY Right     knee replacement    ORTHOPEDIC SURGERY Left 2013    knee replacement    TOTAL KNEE ARTHROPLASTY  8/2013    left    TOTAL KNEE ARTHROPLASTY  2006    complete Right TKA    TOTAL KNEE ARTHROPLASTY  2003    partial right knee       Social History:  Social History     Socioeconomic History    Marital status:      Spouse name: Not on file    Number of children: Not on file    Years of education: Not on file    Highest education level: Not on file   Occupational History    Not on file   Tobacco Use    Smoking status: Never    Smokeless tobacco: Never   Vaping Use    Vaping Use: Never used   Substance and Sexual Activity    Alcohol use: No     Alcohol/week: 0.0 standard drinks    Drug use: No    Sexual activity: Not on file   Other Topics Concern    Not on file   Social History Narrative    Not on file     Social Determinants of Health     Financial Resource Strain: Not on file   Food Insecurity: Not on file   Transportation Needs: Not on file   Physical Activity: Inactive    Days of Exercise per Week: 0 days    Minutes of Exercise per Session: 0 min   Stress: Not on file   Social Connections: Not on file   Intimate Partner Violence: Not on file   Housing Stability: Not on file       Family History:   Family History   Problem Relation Age of Onset    Breast Cancer Mother 79    Other Sister         fibromyalgia    Heart Disease Brother     Breast Cancer Paternal Aunt 45    Post-op Cognitive Dysfunction Neg Hx     Emergence Delirium Neg Hx     Post-op Nausea/Vomiting Neg Hx     Delayed Awakening Neg Hx     Pseudochol.  Deficiency Neg Hx     Malig Hypertherm Neg Hx        Current Outpatient Medications on File Prior to Visit   Medication Sig Dispense Refill    levothyroxine (SYNTHROID) 100 MCG tablet Take 1 tablet by mouth daily 90 tablet 3    Multiple Vitamin (MULTIVITAMIN PO) Take by mouth      CALCIUM PO Take by mouth      omeprazole (PRILOSEC) 40 MG delayed release capsule Take 1 capsule by mouth every morning 90 capsule 3    celecoxib (CELEBREX) 200 MG capsule TAKE 1 CAPSULE BY MOUTH WITH FOOD TWICE A DAY FOR 30 DAYS      DULoxetine (CYMBALTA) 60 MG extended release capsule Take 60 mg by mouth daily TAKE ONE CAPSULE BY MOUTH EVERY DAY      ergocalciferol (ERGOCALCIFEROL) 1.25 MG (87773 UT) capsule Take 50,000 Units by mouth once a week      hydroxychloroquine (PLAQUENIL) 200 MG tablet TAKE 1 TABLET BY MOUTH EVERY DAY WITH FOOD OR MILK      pregabalin (LYRICA) 25 MG capsule Take 25 mg by mouth 2 times daily. colestipol (COLESTID) 1 g tablet 2 times daily (Patient not taking: No sig reported)      LORazepam (ATIVAN) 0.5 MG tablet Take 0.5 mg by mouth. (Patient not taking: No sig reported)       No current facility-administered medications on file prior to visit. No Known Allergies    Review of Systems:  Review of Systems   Constitutional: Negative. HENT: Negative. Eyes: Negative. Respiratory: Negative. Cardiovascular: Negative. Gastrointestinal: Negative. Endocrine: Negative. Genitourinary: Negative. Musculoskeletal: Negative. Allergic/Immunologic: Negative. Neurological:         Memory loss   Hematological: Negative. Psychiatric/Behavioral: Negative. No flowsheet data found. No flowsheet data found. Vitals:    12/20/22 0928   BP: 132/80   Pulse: 90   SpO2: 99%   Weight: 155 lb 9.6 oz (70.6 kg)        Physical Exam  Constitutional:       Appearance: Normal appearance. HENT:      Head: Normocephalic and atraumatic. Eyes:      Extraocular Movements: Extraocular movements intact and EOM normal.      Pupils: Pupils are equal, round, and reactive to light. Cardiovascular:      Rate and Rhythm: Normal rate and regular rhythm. Pulses: Normal pulses. Pulmonary:      Effort: Pulmonary effort is normal.   Abdominal:      Palpations: Abdomen is soft.    Neurological:      Mental Status: She is alert. Gait: Gait is intact. Deep Tendon Reflexes:      Reflex Scores:       Tricep reflexes are 1+ on the right side and 1+ on the left side. Bicep reflexes are 1+ on the right side and 1+ on the left side. Brachioradialis reflexes are 1+ on the right side and 1+ on the left side. Patellar reflexes are 1+ on the right side and 1+ on the left side. Achilles reflexes are 1+ on the right side and 1+ on the left side. Neurologic Exam     Mental Status   Attention: decreased. Concentration: decreased. Level of consciousness: alert  Knowledge: poor. The patient does not know the year at this does know the president does not know the month even when confronted about the holiday which is Gely she is aware of but still cannot come up with the month she does know the season she remembers 1 of 3 objects immediately and 03 objects on the second attempt immediately. She can draw a box she can draw a clock she does not recall [address] or 911. Cranial Nerves     CN II   Visual fields full to confrontation. CN III, IV, VI   Pupils are equal, round, and reactive to light. Extraocular motions are normal.     CN VII   Facial expression full, symmetric. Motor Exam   Right arm tone: normal  Left arm tone: normal  Right leg tone: normal  Left leg tone: normal    Gait, Coordination, and Reflexes     Gait  Gait: normal    Tremor   Resting tremor: absent  Intention tremor: absent  Action tremor: absent    Reflexes   Right brachioradialis: 1+  Left brachioradialis: 1+  Right biceps: 1+  Left biceps: 1+  Right triceps: 1+  Left triceps: 1+  Right patellar: 1+  Left patellar: 1+  Right achilles: 1+  Left achilles: 1+        Assessment   Assessment / Plan:    Sada Wong was seen today for follow-up. Diagnoses and all orders for this visit:    Memory loss that does take on the form of Alzheimer's type dementia with short-term memory loss its progressively getting worse.   She does seem a little worse than last time I saw her so we are going to bump up Namenda or continue Namenda at 10 twice a day and add Aricept 5 mg at bedtime. They can consider starting an omega vitamin she is already on vitamin D and she could not tolerate [place] main. But doing well with Namenda. Other orders  -     memantine (NAMENDA) 10 MG tablet; Take 1 tablet by mouth 2 times daily Take half bid for one month than one bid  -     donepezil (ARICEPT) 5 MG tablet; Take 1 tablet by mouth nightly        The Diagnosis and differential diagnostic considerations, and Rx Tx were reviewed with the patient at length. No orders of the defined types were placed in this encounter. I have spent greater than 50% of visit discussing and counseling of patient  for treatment and diagnostic plan review. Total time 30 min     . Notes: Patient is to continue all medications as directed by prescribing physicians. Continuations on today's visit are made based on the patient's report of current medications.              Dr. James Patton  Consultation Neurology, Neurodiagnostics and Neurotherapeutics  Neuroelectrophysiology, EEG, EMG  [facility]  [address] [place] [address], [address] [facility]  Phone:  [phone]  Fax:   [phone]

## 2022-12-22 ENCOUNTER — NURSE ONLY (OUTPATIENT)
Dept: FAMILY MEDICINE CLINIC | Facility: CLINIC | Age: 72
End: 2022-12-22
Payer: MEDICARE

## 2022-12-22 DIAGNOSIS — R31.9 HEMATURIA, UNSPECIFIED TYPE: Primary | ICD-10-CM

## 2022-12-22 LAB
BILIRUBIN, URINE, POC: NEGATIVE
BLOOD URINE, POC: NEGATIVE
GLUCOSE URINE, POC: NEGATIVE
KETONES, URINE, POC: NEGATIVE
LEUKOCYTE ESTERASE, URINE, POC: NEGATIVE
NITRITE, URINE, POC: NEGATIVE
PH, URINE, POC: 7 (ref 4.6–8)
PROTEIN,URINE, POC: NORMAL
SPECIFIC GRAVITY, URINE, POC: 1.02 (ref 1–1.03)
URINALYSIS CLARITY, POC: CLEAR
URINALYSIS COLOR, POC: YELLOW
UROBILINOGEN, POC: 0.2

## 2022-12-22 PROCEDURE — 81003 URINALYSIS AUTO W/O SCOPE: CPT | Performed by: FAMILY MEDICINE

## 2023-01-25 ENCOUNTER — NURSE ONLY (OUTPATIENT)
Dept: FAMILY MEDICINE CLINIC | Facility: CLINIC | Age: 73
End: 2023-01-25

## 2023-01-25 DIAGNOSIS — E03.9 ACQUIRED HYPOTHYROIDISM: ICD-10-CM

## 2023-01-26 LAB — TSH, 3RD GENERATION: 0.24 UIU/ML (ref 0.36–3.74)

## 2023-02-01 ENCOUNTER — TELEPHONE (OUTPATIENT)
Dept: FAMILY MEDICINE CLINIC | Facility: CLINIC | Age: 73
End: 2023-02-01

## 2023-02-01 DIAGNOSIS — E03.9 ACQUIRED HYPOTHYROIDISM: Primary | ICD-10-CM

## 2023-02-01 RX ORDER — LEVOTHYROXINE SODIUM 88 UG/1
88 TABLET ORAL DAILY
Qty: 90 TABLET | Refills: 1 | Status: SHIPPED | OUTPATIENT
Start: 2023-02-01

## 2023-03-06 ENCOUNTER — HOSPITAL ENCOUNTER (OUTPATIENT)
Dept: MAMMOGRAPHY | Age: 73
Discharge: HOME | End: 2023-03-09
Payer: MEDICARE

## 2023-03-06 DIAGNOSIS — E28.39 ESTROGEN DEFICIENCY: ICD-10-CM

## 2023-03-06 DIAGNOSIS — Z12.31 SCREENING MAMMOGRAM, ENCOUNTER FOR: ICD-10-CM

## 2023-03-06 PROCEDURE — 77067 SCR MAMMO BI INCL CAD: CPT

## 2023-03-06 PROCEDURE — 77080 DXA BONE DENSITY AXIAL: CPT

## 2023-03-08 ENCOUNTER — TELEPHONE (OUTPATIENT)
Dept: FAMILY MEDICINE CLINIC | Facility: CLINIC | Age: 73
End: 2023-03-08

## 2023-03-08 NOTE — TELEPHONE ENCOUNTER
Pt coming to office for lab appt 3/16/23. Pt to be added to Dr Ricketts's schedule at 21 [phone] that day to discuss osteoporosis tx options.

## 2023-03-16 ENCOUNTER — NURSE ONLY (OUTPATIENT)
Dept: FAMILY MEDICINE CLINIC | Facility: CLINIC | Age: 73
End: 2023-03-16

## 2023-03-16 ENCOUNTER — OFFICE VISIT (OUTPATIENT)
Dept: FAMILY MEDICINE CLINIC | Facility: CLINIC | Age: 73
End: 2023-03-16
Payer: MEDICARE

## 2023-03-16 VITALS
OXYGEN SATURATION: 97 % | DIASTOLIC BLOOD PRESSURE: 76 MMHG | BODY MASS INDEX: 30.8 KG/M2 | HEART RATE: 86 BPM | WEIGHT: 152.8 LBS | HEIGHT: 59 IN | SYSTOLIC BLOOD PRESSURE: 126 MMHG | TEMPERATURE: 97.2 F

## 2023-03-16 DIAGNOSIS — R41.3 MEMORY LOSS: ICD-10-CM

## 2023-03-16 DIAGNOSIS — E03.9 ACQUIRED HYPOTHYROIDISM: ICD-10-CM

## 2023-03-16 DIAGNOSIS — I10 ESSENTIAL (PRIMARY) HYPERTENSION: ICD-10-CM

## 2023-03-16 DIAGNOSIS — M81.0 AGE-RELATED OSTEOPOROSIS WITHOUT CURRENT PATHOLOGICAL FRACTURE: Primary | ICD-10-CM

## 2023-03-16 LAB — TSH W FREE THYROID IF ABNORMAL: 0.18 UIU/ML (ref 0.36–3.74)

## 2023-03-16 PROCEDURE — 99213 OFFICE O/P EST LOW 20 MIN: CPT | Performed by: FAMILY MEDICINE

## 2023-03-16 PROCEDURE — 1123F ACP DISCUSS/DSCN MKR DOCD: CPT | Performed by: FAMILY MEDICINE

## 2023-03-16 PROCEDURE — 3074F SYST BP LT 130 MM HG: CPT | Performed by: FAMILY MEDICINE

## 2023-03-16 PROCEDURE — 3078F DIAST BP <80 MM HG: CPT | Performed by: FAMILY MEDICINE

## 2023-03-16 RX ORDER — ALENDRONATE SODIUM 70 MG/1
70 TABLET ORAL
Qty: 12 TABLET | Refills: 3 | Status: SHIPPED | OUTPATIENT
Start: 2023-03-16

## 2023-03-16 SDOH — ECONOMIC STABILITY: FOOD INSECURITY: WITHIN THE PAST 12 MONTHS, YOU WORRIED THAT YOUR FOOD WOULD RUN OUT BEFORE YOU GOT MONEY TO BUY MORE.: NEVER TRUE

## 2023-03-16 SDOH — ECONOMIC STABILITY: FOOD INSECURITY: WITHIN THE PAST 12 MONTHS, THE FOOD YOU BOUGHT JUST DIDN'T LAST AND YOU DIDN'T HAVE MONEY TO GET MORE.: NEVER TRUE

## 2023-03-16 SDOH — ECONOMIC STABILITY: HOUSING INSECURITY
IN THE LAST 12 MONTHS, WAS THERE A TIME WHEN YOU DID NOT HAVE A STEADY PLACE TO SLEEP OR SLEPT IN A SHELTER (INCLUDING NOW)?: NO

## 2023-03-16 SDOH — ECONOMIC STABILITY: INCOME INSECURITY: HOW HARD IS IT FOR YOU TO PAY FOR THE VERY BASICS LIKE FOOD, HOUSING, MEDICAL CARE, AND HEATING?: NOT HARD AT ALL

## 2023-03-16 ASSESSMENT — ENCOUNTER SYMPTOMS
COUGH: 0
SHORTNESS OF BREATH: 0
DIARRHEA: 0
CONSTIPATION: 0
EYE DISCHARGE: 0
SINUS PAIN: 0
ABDOMINAL PAIN: 0
CHEST TIGHTNESS: 0

## 2023-03-16 ASSESSMENT — PATIENT HEALTH QUESTIONNAIRE - PHQ9
SUM OF ALL RESPONSES TO PHQ QUESTIONS 1-9: 0
1. LITTLE INTEREST OR PLEASURE IN DOING THINGS: 0
SUM OF ALL RESPONSES TO PHQ QUESTIONS 1-9: 0
2. FEELING DOWN, DEPRESSED OR HOPELESS: 0
SUM OF ALL RESPONSES TO PHQ QUESTIONS 1-9: 0
SUM OF ALL RESPONSES TO PHQ9 QUESTIONS 1 & 2: 0
SUM OF ALL RESPONSES TO PHQ QUESTIONS 1-9: 0

## 2023-03-16 NOTE — PROGRESS NOTES
Ronak Vidal is a 81 y.o. female who presents with   Chief Complaint   Patient presents with    Osteoporosis     Discuss tx options    Neck Pain       History of Present Illness  Pt with progressive memory loss, likely Alzheimer's with recent DEXA showing osteoporosis with FRAX 4% for hip fx and 15% for any osteoporotic fx. Here with  to discuss possible treatment. Adding Vit D and exercise. Some neck pain. BP has been good. Review of Systems  Review of Systems   Constitutional:  Negative for appetite change, fatigue and fever. HENT:  Negative for congestion, ear pain and sinus pain. Eyes:  Negative for discharge. Respiratory:  Negative for cough, chest tightness and shortness of breath. Cardiovascular:  Negative for chest pain, palpitations and leg swelling. Gastrointestinal:  Negative for abdominal pain, constipation and diarrhea. Genitourinary:  Negative for dysuria. Musculoskeletal:  Negative for joint swelling. Skin:  Negative for rash. Neurological:  Negative for headaches. Hematological:  Negative for adenopathy. Psychiatric/Behavioral:  Positive for confusion. Negative for dysphoric mood and sleep disturbance. The patient is not nervous/anxious.        Medications  Current Outpatient Medications   Medication Sig Dispense Refill    alendronate (FOSAMAX) 70 MG tablet Take 1 tablet by mouth every 7 days 12 tablet 3    levothyroxine (SYNTHROID) 88 MCG tablet Take 1 tablet by mouth daily 90 tablet 1    memantine (NAMENDA) 10 MG tablet Take 1 tablet by mouth 2 times daily Take half bid for one month than one bid 180 tablet 3    donepezil (ARICEPT) 5 MG tablet Take 1 tablet by mouth nightly 90 tablet 3    Multiple Vitamin (MULTIVITAMIN PO) Take by mouth      CALCIUM PO Take by mouth      omeprazole (PRILOSEC) 40 MG delayed release capsule Take 1 capsule by mouth every morning 90 capsule 3    celecoxib (CELEBREX) 200 MG capsule TAKE 1 CAPSULE BY MOUTH WITH FOOD TWICE A DAY FOR 30 DAYS      DULoxetine (CYMBALTA) 60 MG extended release capsule Take 60 mg by mouth daily TAKE ONE CAPSULE BY MOUTH EVERY DAY      ergocalciferol (ERGOCALCIFEROL) 1.25 MG (43345 UT) capsule Take 50,000 Units by mouth once a week      hydroxychloroquine (PLAQUENIL) 200 MG tablet TAKE 1 TABLET BY MOUTH EVERY DAY WITH FOOD OR MILK      LORazepam (ATIVAN) 0.5 MG tablet Take 0.5 mg by mouth.      pregabalin (LYRICA) 25 MG capsule Take 25 mg by mouth 2 times daily. colestipol (COLESTID) 1 g tablet 2 times daily (Patient not taking: Reported on 3/16/2023)       No current facility-administered medications for this visit. Past Medical History  Past Medical History:   Diagnosis Date    Arthritis     osteo    Fibromyalgia     GERD (gastroesophageal reflux disease)     controlled w/med    Postoperative stiffness of total knee replacement (HCC)     left    S/P total knee replacement using cement 8/15/2013    Thyroid disease     hypothroid. stable w/med       Surgical History  Past Surgical History:   Procedure Laterality Date    COLONOSCOPY  9/2015    HYSTERECTOMY (CERVIX STATUS UNKNOWN)  1996    ORTHOPEDIC SURGERY Right     knee replacement    ORTHOPEDIC SURGERY Left 2013    knee replacement    TOTAL KNEE ARTHROPLASTY  8/2013    left    TOTAL KNEE ARTHROPLASTY  2006    complete Right TKA    TOTAL KNEE ARTHROPLASTY  2003    partial right knee          Family History  Family History   Problem Relation Age of Onset    Breast Cancer Mother 79    Other Sister         fibromyalgia    Heart Disease Brother     Breast Cancer Paternal Aunt 39    Post-op Cognitive Dysfunction Neg Hx     Emergence Delirium Neg Hx     Post-op Nausea/Vomiting Neg Hx     Delayed Awakening Neg Hx     Pseudochol.  Deficiency Neg Hx     Malig Hypertherm Neg Hx        Social History  Social History     Socioeconomic History    Marital status:      Spouse name: Not on file    Number of children: Not on file    Years of education: Not on file    Highest education level: Not on file   Occupational History    Not on file   Tobacco Use    Smoking status: Never    Smokeless tobacco: Never   Vaping Use    Vaping Use: Never used   Substance and Sexual Activity    Alcohol use: No     Alcohol/week: 0.0 standard drinks    Drug use: No    Sexual activity: Not on file   Other Topics Concern    Not on file   Social History Narrative    Not on file     Social Determinants of Health     Financial Resource Strain: Low Risk     Difficulty of Paying Living Expenses: Not hard at all   Food Insecurity: No Food Insecurity    Worried About [employer] [employer] in the Last Year: Never true    [facility] [employer] in the Last Year: Never true   Transportation Needs: Unknown    Lack of Transportation (Medical): Not on file    Lack of Transportation (Non-Medical): No   Physical Activity: Inactive    Days of Exercise per Week: 0 days    Minutes of Exercise per Session: 0 min   Stress: Not on file   Social Connections: Not on file   Intimate Partner Violence: Not on file   Housing Stability: Unknown    Unable to Pay for Housing in the Last Year: Not on file    Number of Places Lived in the Last Year: Not on file    Unstable Housing in the Last Year: No       Social History     Tobacco Use   Smoking Status Never   Smokeless Tobacco Never       Allergies  No Known Allergies    Vital Signs  Body mass index is 30.86 kg/m². Vitals:    03/16/23 0944   BP: 126/76   Pulse: 86   Temp: 97.2 °F (36.2 °C)   TempSrc: Temporal   SpO2: 97%   Weight: 152 lb 12.8 oz (69.3 kg)   Height: 4' 11\" (1.499 m)       Physical Exam  Physical Exam  Vitals reviewed. Constitutional:       Appearance: Normal appearance. HENT:      Head: Normocephalic. Right Ear: Tympanic membrane normal.      Left Ear: Tympanic membrane normal.      Nose: No congestion. Mouth/Throat:      Pharynx: No oropharyngeal exudate or posterior oropharyngeal erythema.    Eyes:      Extraocular Movements: Extraocular movements intact. Conjunctiva/sclera: Conjunctivae normal.   Cardiovascular:      Rate and Rhythm: Normal rate and regular rhythm. Heart sounds: Normal heart sounds. No murmur heard. Pulmonary:      Effort: Pulmonary effort is normal.      Breath sounds: Normal breath sounds. No wheezing. Musculoskeletal:         General: Tenderness (posterior neck) present. Right lower leg: No edema. Left lower leg: No edema. Lymphadenopathy:      Cervical: No cervical adenopathy. Skin:     General: Skin is warm and dry. Findings: No rash. Neurological:      General: No focal deficit present. Mental Status: She is alert. Psychiatric:         Mood and Affect: Mood normal.         Thought Content: Thought content normal.      Comments: Memory loss; not oriented to place or date        Assessment and Plan  Gayla Shrestha was seen today for osteoporosis and neck pain. Diagnoses and all orders for this visit:    Age-related osteoporosis without current pathological fracture  -     alendronate (FOSAMAX) 70 MG tablet; Take 1 tablet by mouth every 7 days    Memory loss    Essential (primary) hypertension  Vit D  Exercise  Recheck in 6 mo  Discussed Fosamax; call for gi sxs    On this date I have spent 20 minutes reviewing previous notes, test results and face to face with the patient discussing the diagnosis and importance of compliance with the treatment plan as well as documenting on the day of the visit.

## 2023-03-17 LAB
T3 SERPL-MCNC: 0.96 NG/ML (ref 0.6–1.81)
T4 FREE SERPL-MCNC: 1.1 NG/DL (ref 0.78–1.46)

## 2023-03-27 ENCOUNTER — HOSPITAL ENCOUNTER (OUTPATIENT)
Dept: MAMMOGRAPHY | Age: 73
Discharge: HOME | End: 2023-03-30
Payer: MEDICARE

## 2023-03-27 DIAGNOSIS — R92.8 ABNORMAL SCREENING MAMMOGRAM: ICD-10-CM

## 2023-03-27 PROCEDURE — 77065 DX MAMMO INCL CAD UNI: CPT

## 2023-03-27 PROCEDURE — 76642 ULTRASOUND BREAST LIMITED: CPT

## 2023-06-21 ENCOUNTER — OFFICE VISIT (OUTPATIENT)
Dept: NEUROLOGY | Age: 73
End: 2023-06-21
Payer: MEDICARE

## 2023-06-21 VITALS
WEIGHT: 163 LBS | HEART RATE: 85 BPM | HEIGHT: 59 IN | SYSTOLIC BLOOD PRESSURE: 152 MMHG | DIASTOLIC BLOOD PRESSURE: 83 MMHG | BODY MASS INDEX: 32.86 KG/M2 | OXYGEN SATURATION: 96 %

## 2023-06-21 DIAGNOSIS — F02.80 AD (ALZHEIMER'S DISEASE) (HCC): Primary | ICD-10-CM

## 2023-06-21 DIAGNOSIS — G30.9 AD (ALZHEIMER'S DISEASE) (HCC): Primary | ICD-10-CM

## 2023-06-21 PROCEDURE — 3077F SYST BP >= 140 MM HG: CPT | Performed by: PSYCHIATRY & NEUROLOGY

## 2023-06-21 PROCEDURE — 1123F ACP DISCUSS/DSCN MKR DOCD: CPT | Performed by: PSYCHIATRY & NEUROLOGY

## 2023-06-21 PROCEDURE — 3079F DIAST BP 80-89 MM HG: CPT | Performed by: PSYCHIATRY & NEUROLOGY

## 2023-06-21 PROCEDURE — 99214 OFFICE O/P EST MOD 30 MIN: CPT | Performed by: PSYCHIATRY & NEUROLOGY

## 2023-06-21 RX ORDER — DONEPEZIL HYDROCHLORIDE 10 MG/1
10 TABLET, FILM COATED ORAL NIGHTLY
Qty: 90 TABLET | Refills: 3 | Status: SHIPPED | OUTPATIENT
Start: 2023-06-21

## 2023-06-21 RX ORDER — MEMANTINE HYDROCHLORIDE 10 MG/1
10 TABLET ORAL 2 TIMES DAILY
Qty: 180 TABLET | Refills: 3 | Status: SHIPPED | OUTPATIENT
Start: 2023-06-21

## 2023-06-21 ASSESSMENT — ENCOUNTER SYMPTOMS
GASTROINTESTINAL NEGATIVE: 1
RESPIRATORY NEGATIVE: 1
ALLERGIC/IMMUNOLOGIC NEGATIVE: 1
EYES NEGATIVE: 1

## 2023-06-21 NOTE — PROGRESS NOTES
[facility], [address] [facility], [address] [place] [address]  Phone: [phone] Fax [phone]  Dr. Norrine Kocher      6/21/2023  Jayda Miguel     Patient is referred by the following provider for consultation regarding as below:       I reviewed the available records and notes and have examined patient with the following findings:     Chief Complaint:  Chief Complaint   Patient presents with    Follow-up     Follow up for Memory loss          HPI: This is a right handed 81 y.o.  female here with her  who is a primary historian if you asked the patient she would say that she is doing great with no progression but her  states she is definitely progressed she is gotten to the point where she has a very difficult time coming up with her children's names and she gets them very much confused a bit here in the office we asked the 3 children's names who are local all of them are very active and in her life so these are not distant people. She could only name 2 of her children without strong and heavy encouragement from her . Two-step commands are difficult for her to do she can do 1 step commands when given a second task she has to stop and in process. And her processing is not very well. She has been on Namenda 10 twice a day and Aricept 10 mill 5 mg daily. She her Mini-Mental status exam clearly shows significant and advanced dementia process. And her short-term memory loss does take on the form of Alzheimer's type dementia. IMAGING REVIEW:  I REVIEWED PERTINENT  IMAGES AND REPORTS WITH THE PATIENT PERSONALLY, DIRECTLY AND FULLY. Past Medical History:  Past Medical History:   Diagnosis Date    Arthritis     osteo    Fibromyalgia     GERD (gastroesophageal reflux disease)     controlled w/med    Postoperative stiffness of total knee replacement (HCC)     left    S/P total knee replacement using cement 8/15/2013    Thyroid disease     hypothroid. stable

## 2023-08-02 DIAGNOSIS — E03.9 ACQUIRED HYPOTHYROIDISM: ICD-10-CM

## 2023-08-02 RX ORDER — LEVOTHYROXINE SODIUM 88 UG/1
88 TABLET ORAL DAILY
Qty: 90 TABLET | Refills: 1 | OUTPATIENT
Start: 2023-08-02

## 2023-10-11 DIAGNOSIS — E03.9 ACQUIRED HYPOTHYROIDISM: ICD-10-CM

## 2023-10-11 RX ORDER — LEVOTHYROXINE SODIUM 88 UG/1
88 TABLET ORAL DAILY
Qty: 30 TABLET | Refills: 0 | Status: SHIPPED | OUTPATIENT
Start: 2023-10-11

## 2023-10-11 RX ORDER — LEVOTHYROXINE SODIUM 88 UG/1
88 TABLET ORAL DAILY
Qty: 90 TABLET | OUTPATIENT
Start: 2023-10-11

## 2023-12-07 DIAGNOSIS — E03.9 ACQUIRED HYPOTHYROIDISM: ICD-10-CM

## 2023-12-07 RX ORDER — LEVOTHYROXINE SODIUM 88 UG/1
88 TABLET ORAL DAILY
Qty: 30 TABLET | Refills: 0 | OUTPATIENT
Start: 2023-12-07

## 2023-12-08 DIAGNOSIS — E03.9 ACQUIRED HYPOTHYROIDISM: ICD-10-CM

## 2023-12-08 RX ORDER — LEVOTHYROXINE SODIUM 88 UG/1
88 TABLET ORAL DAILY
Qty: 30 TABLET | Refills: 0 | Status: SHIPPED | OUTPATIENT
Start: 2023-12-08

## 2023-12-08 RX ORDER — LEVOTHYROXINE SODIUM 88 UG/1
88 TABLET ORAL DAILY
Qty: 90 TABLET | OUTPATIENT
Start: 2023-12-08

## 2023-12-08 NOTE — TELEPHONE ENCOUNTER
Pt has scheduled OV for 12/13/23. Erxed 30-day supply to local pharmacy. No further refills without OV.

## 2023-12-13 ENCOUNTER — OFFICE VISIT (OUTPATIENT)
Dept: FAMILY MEDICINE CLINIC | Facility: CLINIC | Age: 73
End: 2023-12-13
Payer: MEDICARE

## 2023-12-13 VITALS
HEART RATE: 84 BPM | HEIGHT: 59 IN | WEIGHT: 163.6 LBS | SYSTOLIC BLOOD PRESSURE: 134 MMHG | BODY MASS INDEX: 32.98 KG/M2 | DIASTOLIC BLOOD PRESSURE: 84 MMHG | TEMPERATURE: 97.4 F | OXYGEN SATURATION: 98 %

## 2023-12-13 DIAGNOSIS — M81.0 AGE-RELATED OSTEOPOROSIS WITHOUT CURRENT PATHOLOGICAL FRACTURE: ICD-10-CM

## 2023-12-13 DIAGNOSIS — Z23 FLU VACCINE NEED: ICD-10-CM

## 2023-12-13 DIAGNOSIS — R41.3 MEMORY LOSS: ICD-10-CM

## 2023-12-13 DIAGNOSIS — E03.9 ACQUIRED HYPOTHYROIDISM: ICD-10-CM

## 2023-12-13 DIAGNOSIS — R19.7 DIARRHEA, UNSPECIFIED TYPE: ICD-10-CM

## 2023-12-13 DIAGNOSIS — I10 PRIMARY HYPERTENSION: ICD-10-CM

## 2023-12-13 DIAGNOSIS — G30.9 ALZHEIMER'S DISEASE, UNSPECIFIED (CODE) (HCC): Primary | ICD-10-CM

## 2023-12-13 LAB
T4 FREE SERPL-MCNC: 0.8 NG/DL (ref 0.78–1.46)
TSH W FREE THYROID IF ABNORMAL: 4.56 UIU/ML (ref 0.36–3.74)

## 2023-12-13 PROCEDURE — 90694 VACC AIIV4 NO PRSRV 0.5ML IM: CPT | Performed by: FAMILY MEDICINE

## 2023-12-13 PROCEDURE — 3075F SYST BP GE 130 - 139MM HG: CPT | Performed by: FAMILY MEDICINE

## 2023-12-13 PROCEDURE — G0008 ADMIN INFLUENZA VIRUS VAC: HCPCS | Performed by: FAMILY MEDICINE

## 2023-12-13 PROCEDURE — 3079F DIAST BP 80-89 MM HG: CPT | Performed by: FAMILY MEDICINE

## 2023-12-13 PROCEDURE — 99214 OFFICE O/P EST MOD 30 MIN: CPT | Performed by: FAMILY MEDICINE

## 2023-12-13 PROCEDURE — 1123F ACP DISCUSS/DSCN MKR DOCD: CPT | Performed by: FAMILY MEDICINE

## 2023-12-13 RX ORDER — LEVOTHYROXINE SODIUM 88 UG/1
88 TABLET ORAL DAILY
Qty: 90 TABLET | Refills: 1 | Status: SHIPPED | OUTPATIENT
Start: 2023-12-13 | End: 2023-12-14 | Stop reason: SDUPTHER

## 2023-12-13 ASSESSMENT — ENCOUNTER SYMPTOMS
COUGH: 0
CHEST TIGHTNESS: 0
DIARRHEA: 0
EYE DISCHARGE: 0
SHORTNESS OF BREATH: 0
CONSTIPATION: 0
SINUS PAIN: 0
ABDOMINAL PAIN: 0

## 2023-12-13 NOTE — PROGRESS NOTES
Ajay Johnson is a 72 y.o. female who presents with   Chief Complaint   Patient presents with    Medication Refill    Medication Problem     Fosamax causing diarrhea       History of Present Illness  Pt with Alzheimer's with continued memory issues. Started Fosamax and having related Diarrhea once a week. No abd pain. Here for recheck of thyroid. No temperature intolerance. No palpitations. No skin or hair changes. No increased fatigue. No jitteriness. Mood stable. No agitation or wandering. Review of Systems  Review of Systems   Constitutional:  Negative for appetite change, fatigue and fever. HENT:  Negative for congestion, ear pain and sinus pain. Eyes:  Negative for discharge. Respiratory:  Negative for cough, chest tightness and shortness of breath. Cardiovascular:  Negative for chest pain, palpitations and leg swelling. Gastrointestinal:  Negative for abdominal pain, constipation and diarrhea. Genitourinary:  Negative for dysuria. Musculoskeletal:  Negative for joint swelling. Skin:  Negative for rash. Neurological:  Negative for headaches. Hematological:  Negative for adenopathy. Psychiatric/Behavioral:  Negative for dysphoric mood. The patient is not nervous/anxious.          Medications  Current Outpatient Medications   Medication Sig Dispense Refill    levothyroxine (SYNTHROID) 88 MCG tablet Take 1 tablet by mouth daily 90 tablet 1    memantine (NAMENDA) 10 MG tablet Take 1 tablet by mouth 2 times daily Take half bid for one month than one bid 180 tablet 3    donepezil (ARICEPT) 10 MG tablet Take 1 tablet by mouth nightly 90 tablet 3    alendronate (FOSAMAX) 70 MG tablet Take 1 tablet by mouth every 7 days 12 tablet 3    Multiple Vitamin (MULTIVITAMIN PO) Take by mouth      CALCIUM PO Take by mouth      omeprazole (PRILOSEC) 40 MG delayed release capsule Take 1 capsule by mouth every morning 90 capsule 3    celecoxib (CELEBREX) 200 MG capsule TAKE 1 CAPSULE BY

## 2023-12-14 DIAGNOSIS — E03.9 ACQUIRED HYPOTHYROIDISM: Primary | ICD-10-CM

## 2023-12-14 DIAGNOSIS — E03.9 ACQUIRED HYPOTHYROIDISM: ICD-10-CM

## 2023-12-14 RX ORDER — LEVOTHYROXINE SODIUM 0.1 MG/1
100 TABLET ORAL DAILY
Qty: 90 TABLET | Refills: 1 | Status: SHIPPED | OUTPATIENT
Start: 2023-12-14

## 2024-01-30 ENCOUNTER — NURSE ONLY (OUTPATIENT)
Dept: FAMILY MEDICINE CLINIC | Facility: CLINIC | Age: 74
End: 2024-01-30

## 2024-01-30 DIAGNOSIS — E03.9 ACQUIRED HYPOTHYROIDISM: ICD-10-CM

## 2024-01-30 LAB — TSH W FREE THYROID IF ABNORMAL: 2.44 UIU/ML (ref 0.36–3.74)

## 2024-02-22 ENCOUNTER — OFFICE VISIT (OUTPATIENT)
Dept: FAMILY MEDICINE CLINIC | Facility: CLINIC | Age: 74
End: 2024-02-22

## 2024-02-22 VITALS
OXYGEN SATURATION: 96 % | HEIGHT: 59 IN | HEART RATE: 55 BPM | WEIGHT: 167 LBS | TEMPERATURE: 97.2 F | BODY MASS INDEX: 33.67 KG/M2 | SYSTOLIC BLOOD PRESSURE: 138 MMHG | DIASTOLIC BLOOD PRESSURE: 78 MMHG

## 2024-02-22 DIAGNOSIS — G30.9 ALZHEIMER'S DISEASE, UNSPECIFIED (CODE) (HCC): Primary | ICD-10-CM

## 2024-02-22 DIAGNOSIS — R53.83 FATIGUE, UNSPECIFIED TYPE: Primary | ICD-10-CM

## 2024-02-22 DIAGNOSIS — I10 PRIMARY HYPERTENSION: ICD-10-CM

## 2024-02-22 DIAGNOSIS — R53.83 FATIGUE, UNSPECIFIED TYPE: ICD-10-CM

## 2024-02-22 LAB
ALBUMIN SERPL-MCNC: 3.6 G/DL (ref 3.2–4.6)
ALBUMIN/GLOB SERPL: 1.1 (ref 0.4–1.6)
ALP SERPL-CCNC: 90 U/L (ref 50–136)
ALT SERPL-CCNC: 54 U/L (ref 12–65)
ANION GAP SERPL CALC-SCNC: 3 MMOL/L (ref 2–11)
AST SERPL-CCNC: 28 U/L (ref 15–37)
BASOPHILS # BLD: 0 K/UL (ref 0–0.2)
BASOPHILS NFR BLD: 1 % (ref 0–2)
BILIRUB SERPL-MCNC: 0.3 MG/DL (ref 0.2–1.1)
BUN SERPL-MCNC: 20 MG/DL (ref 8–23)
CALCIUM SERPL-MCNC: 9.7 MG/DL (ref 8.3–10.4)
CHLORIDE SERPL-SCNC: 109 MMOL/L (ref 103–113)
CO2 SERPL-SCNC: 28 MMOL/L (ref 21–32)
CREAT SERPL-MCNC: 0.9 MG/DL (ref 0.6–1)
DIFFERENTIAL METHOD BLD: ABNORMAL
EOSINOPHIL # BLD: 0.1 K/UL (ref 0–0.8)
EOSINOPHIL NFR BLD: 2 % (ref 0.5–7.8)
ERYTHROCYTE [DISTWIDTH] IN BLOOD BY AUTOMATED COUNT: 19.5 % (ref 11.9–14.6)
GLOBULIN SER CALC-MCNC: 3.2 G/DL (ref 2.8–4.5)
GLUCOSE SERPL-MCNC: 97 MG/DL (ref 65–100)
HCT VFR BLD AUTO: 21.7 % (ref 35.8–46.3)
HGB BLD-MCNC: 5.8 G/DL (ref 11.7–15.4)
IMM GRANULOCYTES # BLD AUTO: 0 K/UL (ref 0–0.5)
IMM GRANULOCYTES NFR BLD AUTO: 0 % (ref 0–5)
LYMPHOCYTES # BLD: 1.3 K/UL (ref 0.5–4.6)
LYMPHOCYTES NFR BLD: 24 % (ref 13–44)
MCH RBC QN AUTO: 17.8 PG (ref 26.1–32.9)
MCHC RBC AUTO-ENTMCNC: 26.3 G/DL (ref 31.4–35)
MCV RBC AUTO: 67.8 FL (ref 82–102)
MONOCYTES # BLD: 0.6 K/UL (ref 0.1–1.3)
MONOCYTES NFR BLD: 10 % (ref 4–12)
NEUTS SEG # BLD: 3.6 K/UL (ref 1.7–8.2)
NEUTS SEG NFR BLD: 63 % (ref 43–78)
NRBC # BLD: 0.02 K/UL (ref 0–0.2)
PLATELET # BLD AUTO: 443 K/UL (ref 150–450)
PMV BLD AUTO: 10.7 FL (ref 9.4–12.3)
POTASSIUM SERPL-SCNC: 4.5 MMOL/L (ref 3.5–5.1)
PROT SERPL-MCNC: 6.8 G/DL (ref 6.3–8.2)
RBC # BLD AUTO: 3.2 M/UL (ref 4.05–5.2)
SODIUM SERPL-SCNC: 140 MMOL/L (ref 136–146)
WBC # BLD AUTO: 5.7 K/UL (ref 4.3–11.1)

## 2024-02-22 ASSESSMENT — ENCOUNTER SYMPTOMS
SHORTNESS OF BREATH: 0
CONSTIPATION: 0
CHEST TIGHTNESS: 0
SINUS PAIN: 0
COUGH: 0
ABDOMINAL PAIN: 0
DIARRHEA: 0
EYE DISCHARGE: 0

## 2024-02-22 ASSESSMENT — PATIENT HEALTH QUESTIONNAIRE - PHQ9: DEPRESSION UNABLE TO ASSESS: FUNCTIONAL CAPACITY MOTIVATION LIMITS ACCURACY

## 2024-02-22 NOTE — PROGRESS NOTES
sounds: Normal breath sounds. No wheezing.   Musculoskeletal:         General: No tenderness.      Right lower leg: No edema.      Left lower leg: No edema.   Lymphadenopathy:      Cervical: No cervical adenopathy.   Skin:     General: Skin is warm and dry.      Findings: No rash.   Neurological:      General: No focal deficit present.      Mental Status: She is alert.   Psychiatric:         Mood and Affect: Mood normal.         Thought Content: Thought content normal.          Assessment and Plan  Dee Dee was seen today for fatigue.    Diagnoses and all orders for this visit:    Alzheimer's disease, unspecified    Fatigue, unspecified type  -     CBC with Auto Differential; Future  -     Comprehensive Metabolic Panel; Future  -     Cancel: Urinalysis with Reflex to Culture; Future  -     Cancel: Urinalysis with Reflex to Culture  -     Comprehensive Metabolic Panel  -     CBC with Auto Differential    Primary hypertension    Await labs  Call for worsening    On this date I have spent 30 minutes reviewing previous notes, test results and face to face with the patient discussing the diagnosis and importance of compliance with the treatment plan as well as documenting on the day of the visit.

## 2024-02-23 ENCOUNTER — HOSPITAL ENCOUNTER (EMERGENCY)
Age: 74
Discharge: HOME | End: 2024-02-23
Attending: EMERGENCY MEDICINE
Payer: MEDICARE

## 2024-02-23 ENCOUNTER — APPOINTMENT (OUTPATIENT)
Dept: GENERAL RADIOLOGY | Age: 74
End: 2024-02-23
Payer: MEDICARE

## 2024-02-23 VITALS
OXYGEN SATURATION: 98 % | HEIGHT: 59 IN | BODY MASS INDEX: 33.67 KG/M2 | TEMPERATURE: 97.7 F | HEART RATE: 67 BPM | SYSTOLIC BLOOD PRESSURE: 158 MMHG | WEIGHT: 167 LBS | DIASTOLIC BLOOD PRESSURE: 69 MMHG | RESPIRATION RATE: 20 BRPM

## 2024-02-23 DIAGNOSIS — D64.9 SYMPTOMATIC ANEMIA: Primary | ICD-10-CM

## 2024-02-23 LAB
ALBUMIN SERPL-MCNC: 3.4 G/DL (ref 3.2–4.6)
ALBUMIN/GLOB SERPL: 1 (ref 0.4–1.6)
ALP SERPL-CCNC: 87 U/L (ref 50–136)
ALT SERPL-CCNC: 51 U/L (ref 12–65)
ANION GAP SERPL CALC-SCNC: 5 MMOL/L (ref 2–11)
AST SERPL-CCNC: 23 U/L (ref 15–37)
BASOPHILS # BLD: 0 K/UL (ref 0–0.2)
BASOPHILS NFR BLD: 1 % (ref 0–2)
BILIRUB SERPL-MCNC: 0.4 MG/DL (ref 0.2–1.1)
BUN SERPL-MCNC: 12 MG/DL (ref 8–23)
CALCIUM SERPL-MCNC: 9.1 MG/DL (ref 8.3–10.4)
CHLORIDE SERPL-SCNC: 110 MMOL/L (ref 103–113)
CO2 SERPL-SCNC: 26 MMOL/L (ref 21–32)
CREAT SERPL-MCNC: 0.8 MG/DL (ref 0.6–1)
DIFFERENTIAL METHOD BLD: ABNORMAL
EKG ATRIAL RATE: 94 BPM
EKG DIAGNOSIS: NORMAL
EKG P AXIS: 42 DEGREES
EKG P-R INTERVAL: 150 MS
EKG Q-T INTERVAL: 352 MS
EKG QRS DURATION: 74 MS
EKG QTC CALCULATION (BAZETT): 440 MS
EKG R AXIS: -22 DEGREES
EKG T AXIS: 41 DEGREES
EKG VENTRICULAR RATE: 94 BPM
EOSINOPHIL # BLD: 0.1 K/UL (ref 0–0.8)
EOSINOPHIL NFR BLD: 2 % (ref 0.5–7.8)
ERYTHROCYTE [DISTWIDTH] IN BLOOD BY AUTOMATED COUNT: 19.4 % (ref 11.9–14.6)
ERYTHROCYTE [DISTWIDTH] IN BLOOD BY AUTOMATED COUNT: 21.8 % (ref 11.9–14.6)
FERRITIN SERPL-MCNC: 5 NG/ML (ref 8–388)
FOLATE SERPL-MCNC: 42.2 NG/ML (ref 3.1–17.5)
GLOBULIN SER CALC-MCNC: 3.5 G/DL (ref 2.8–4.5)
GLUCOSE SERPL-MCNC: 86 MG/DL (ref 65–100)
HCT VFR BLD AUTO: 22.2 % (ref 35.8–46.3)
HCT VFR BLD AUTO: 25.6 % (ref 35.8–46.3)
HCT VFR BLD AUTO: 29 % (ref 35.8–46.3)
HGB BLD-MCNC: 6 G/DL (ref 11.7–15.4)
HGB BLD-MCNC: 7.1 G/DL (ref 11.7–15.4)
HGB BLD-MCNC: 8.3 G/DL (ref 11.7–15.4)
HISTORY CHECK: NORMAL
HISTORY CHECK: NORMAL
IMM GRANULOCYTES # BLD AUTO: 0 K/UL (ref 0–0.5)
IMM GRANULOCYTES NFR BLD AUTO: 1 % (ref 0–5)
LYMPHOCYTES # BLD: 0.9 K/UL (ref 0.5–4.6)
LYMPHOCYTES NFR BLD: 18 % (ref 13–44)
MAGNESIUM SERPL-MCNC: 1.9 MG/DL (ref 1.8–2.4)
MCH RBC QN AUTO: 18.2 PG (ref 26.1–32.9)
MCH RBC QN AUTO: 19.4 PG (ref 26.1–32.9)
MCHC RBC AUTO-ENTMCNC: 27 G/DL (ref 31.4–35)
MCHC RBC AUTO-ENTMCNC: 27.7 G/DL (ref 31.4–35)
MCV RBC AUTO: 67.5 FL (ref 82–102)
MCV RBC AUTO: 69.9 FL (ref 82–102)
MONOCYTES # BLD: 0.4 K/UL (ref 0.1–1.3)
MONOCYTES NFR BLD: 8 % (ref 4–12)
NEUTS SEG # BLD: 3.8 K/UL (ref 1.7–8.2)
NEUTS SEG NFR BLD: 71 % (ref 43–78)
NRBC # BLD: 0 K/UL (ref 0–0.2)
NRBC # BLD: 0.02 K/UL (ref 0–0.2)
PLATELET # BLD AUTO: 379 K/UL (ref 150–450)
PLATELET # BLD AUTO: 424 K/UL (ref 150–450)
PMV BLD AUTO: 10.3 FL (ref 9.4–12.3)
PMV BLD AUTO: 10.5 FL (ref 9.4–12.3)
POTASSIUM SERPL-SCNC: 4 MMOL/L (ref 3.5–5.1)
PROT SERPL-MCNC: 6.9 G/DL (ref 6.3–8.2)
RBC # BLD AUTO: 3.29 M/UL (ref 4.05–5.2)
RBC # BLD AUTO: 3.66 M/UL (ref 4.05–5.2)
SODIUM SERPL-SCNC: 141 MMOL/L (ref 136–146)
VIT B12 SERPL-MCNC: 1787 PG/ML (ref 193–986)
WBC # BLD AUTO: 5 K/UL (ref 4.3–11.1)
WBC # BLD AUTO: 5.3 K/UL (ref 4.3–11.1)

## 2024-02-23 PROCEDURE — P9016 RBC LEUKOCYTES REDUCED: HCPCS

## 2024-02-23 PROCEDURE — 85014 HEMATOCRIT: CPT

## 2024-02-23 PROCEDURE — 36430 TRANSFUSION BLD/BLD COMPNT: CPT

## 2024-02-23 PROCEDURE — 71045 X-RAY EXAM CHEST 1 VIEW: CPT

## 2024-02-23 PROCEDURE — 93010 ELECTROCARDIOGRAM REPORT: CPT | Performed by: INTERNAL MEDICINE

## 2024-02-23 PROCEDURE — 85027 COMPLETE CBC AUTOMATED: CPT

## 2024-02-23 PROCEDURE — 83735 ASSAY OF MAGNESIUM: CPT

## 2024-02-23 PROCEDURE — 86920 COMPATIBILITY TEST SPIN: CPT

## 2024-02-23 PROCEDURE — 82607 VITAMIN B-12: CPT

## 2024-02-23 PROCEDURE — 82728 ASSAY OF FERRITIN: CPT

## 2024-02-23 PROCEDURE — 86901 BLOOD TYPING SEROLOGIC RH(D): CPT

## 2024-02-23 PROCEDURE — 86900 BLOOD TYPING SEROLOGIC ABO: CPT

## 2024-02-23 PROCEDURE — 99285 EMERGENCY DEPT VISIT HI MDM: CPT

## 2024-02-23 PROCEDURE — 80053 COMPREHEN METABOLIC PANEL: CPT

## 2024-02-23 PROCEDURE — 82746 ASSAY OF FOLIC ACID SERUM: CPT

## 2024-02-23 PROCEDURE — 93005 ELECTROCARDIOGRAM TRACING: CPT | Performed by: EMERGENCY MEDICINE

## 2024-02-23 PROCEDURE — 85025 COMPLETE CBC W/AUTO DIFF WBC: CPT

## 2024-02-23 PROCEDURE — 85018 HEMOGLOBIN: CPT

## 2024-02-23 PROCEDURE — 86850 RBC ANTIBODY SCREEN: CPT

## 2024-02-23 RX ORDER — SODIUM CHLORIDE 9 MG/ML
INJECTION, SOLUTION INTRAVENOUS PRN
Status: DISCONTINUED | OUTPATIENT
Start: 2024-02-23 | End: 2024-02-23 | Stop reason: HOSPADM

## 2024-02-23 ASSESSMENT — PAIN - FUNCTIONAL ASSESSMENT: PAIN_FUNCTIONAL_ASSESSMENT: NONE - DENIES PAIN

## 2024-02-23 NOTE — ED PROVIDER NOTES
SURGERY; Right      Comment:  knee replacement  2013: ORTHOPEDIC SURGERY; Left      Comment:  knee replacement  8/2013: TOTAL KNEE ARTHROPLASTY      Comment:  left  2006: TOTAL KNEE ARTHROPLASTY      Comment:  complete Right TKA  2003: TOTAL KNEE ARTHROPLASTY      Comment:  partial right knee     Social History    Socioeconomic History      Marital status:     Tobacco Use      Smoking status: Never      Smokeless tobacco: Never    Vaping Use      Vaping Use: Never used    Substance and Sexual Activity      Alcohol use: No        Alcohol/week: 0.0 standard drinks of alcohol      Drug use: No      Sexual activity: Not Currently        Partners: Male    Social Determinants of Health  Financial Resource Strain: Low Risk  (3/16/2023)      Overall Financial Resource Strain (CARDIA)          Difficulty of Paying Living Expenses: Not hard at all  Transportation Needs: Unknown (3/16/2023)      PRAPARE - Transportation          Lack of Transportation (Non-Medical): No  Physical Activity: Inactive (12/13/2022)      Exercise Vital Sign          Days of Exercise per Week: 0 days          Minutes of Exercise per Session: 0 min  Housing Stability: Unknown (3/16/2023)      Housing Stability Vital Sign          Unstable Housing in the Last Year: No     Previous Medications  ALENDRONATE (FOSAMAX) 70 MG TABLET     Take 1 tablet by mouth every 7 days  CALCIUM PO     Take by mouth  CELECOXIB (CELEBREX) 200 MG CAPSULE     TAKE 1 CAPSULE BY MOUTH WITH FOOD TWICE A DAY FOR 30 DAYS  DONEPEZIL (ARICEPT) 10 MG TABLET     Take 1 tablet by mouth nightly  DULOXETINE (CYMBALTA) 60 MG EXTENDED RELEASE CAPSULE     Take 1 capsule by mouth daily TAKE ONE CAPSULE BY MOUTH EVERY DAY  LEVOTHYROXINE (SYNTHROID) 100 MCG TABLET     Take 1 tablet by mouth daily  LORAZEPAM (ATIVAN) 0.5 MG TABLET     Take 1 tablet by mouth.  MEMANTINE (NAMENDA) 10 MG TABLET     Take 1 tablet by mouth 2 times daily Take half bid for one month than one bid  MULTIPLE

## 2024-02-23 NOTE — DISCHARGE INSTRUCTIONS
Will need ongoing workup as to the cause of your anemia  Hemoccult of available stool specimen was negative in the ER  Avoid anything that might increase bleeding such as aspirin, ibuprofen or Aleve or any blood thinning type medicine

## 2024-02-23 NOTE — ED TRIAGE NOTES
Edwin Antoni- - reports of patent having low hemoglobin 5.8 per her Primary doctor. Patient is experiencing shortness of breath, fatigue, sleeping a lot. Patient is at her baseline per . HX: dementia,

## 2024-02-24 LAB
ABO + RH BLD: NORMAL
BLD PROD TYP BPU: NORMAL
BLD PROD TYP BPU: NORMAL
BLOOD BANK CMNT PATIENT-IMP: NORMAL
BLOOD BANK DISPENSE STATUS: NORMAL
BLOOD BANK DISPENSE STATUS: NORMAL
BLOOD GROUP ANTIBODIES SERPL: NORMAL
BPU ID: NORMAL
BPU ID: NORMAL
CROSSMATCH RESULT: NORMAL
CROSSMATCH RESULT: NORMAL
SPECIMEN EXP DATE BLD: NORMAL
UNIT DIVISION: 0
UNIT DIVISION: 0

## 2024-02-24 NOTE — DISCHARGE INSTR - COC
Continuity of Care Form    Patient Name: Dee Dee Corrales   :  1950  MRN:  [id number]    Admit date:  2024  Discharge date:  ***    Code Status Order: No Order   Advance Directives:     Admitting Physician:  No admitting provider for patient encounter.  PCP: Alisha Ricketts MD    Discharging Nurse: ***  Discharging Hospital Unit/Room#: ER32/32  Discharging Unit Phone Number: ***    Emergency Contact:   Extended Emergency Contact Information  Primary Emergency Contact: Paul Goins  Address: [address] [place] [address] [place] [address] [place]  Mobile Phone: [phone]  Relation: Spouse    Past Surgical History:  Past Surgical History:   Procedure Laterality Date    COLONOSCOPY  2015    HYSTERECTOMY (CERVIX STATUS UNKNOWN)      ORTHOPEDIC SURGERY Right     knee replacement    ORTHOPEDIC SURGERY Left 2013    knee replacement    TOTAL KNEE ARTHROPLASTY  2013    left    TOTAL KNEE ARTHROPLASTY      complete Right TKA    TOTAL KNEE ARTHROPLASTY      partial right knee       Immunization History:   Immunization History   Administered Date(s) Administered    COVID-19, PFIZER PURPLE top, DILUTE for use, (age 12 y+), 30mcg/0.3mL 2021, 2021    Influenza, FLUAD, (age 65 y+), Adjuvanted, 0.5mL 2022, 2023    PPD Test 2013    Pneumococcal, PCV-13, PREVNAR 13, (age 6w+), IM, 0.5mL 2018    Pneumococcal, PPSV23, PNEUMOVAX 23, (age 2y+), SC/IM, 0.5mL 2019       Active Problems:  Patient Active Problem List   Diagnosis Code    Lymphocytic colitis K52.832    Esophageal reflux K21.9    Fibromyalgia M79.7    Osteoarthritis of left knee M17.12    HTN (hypertension) I10    Postoperative stiffness of total knee replacement (HCC) T84.89XA, M25.669, Z96.659    Alzheimer's disease, unspecified G30.9       Isolation/Infection:   Isolation            No Isolation          Patient Infection Status       None to display            Nurse

## 2024-02-28 ENCOUNTER — OFFICE VISIT (OUTPATIENT)
Dept: FAMILY MEDICINE CLINIC | Facility: CLINIC | Age: 74
End: 2024-02-28
Payer: MEDICARE

## 2024-02-28 VITALS
WEIGHT: 166.8 LBS | BODY MASS INDEX: 33.63 KG/M2 | HEART RATE: 73 BPM | TEMPERATURE: 97.5 F | DIASTOLIC BLOOD PRESSURE: 76 MMHG | HEIGHT: 59 IN | SYSTOLIC BLOOD PRESSURE: 116 MMHG | OXYGEN SATURATION: 99 %

## 2024-02-28 DIAGNOSIS — I10 PRIMARY HYPERTENSION: ICD-10-CM

## 2024-02-28 DIAGNOSIS — G30.9 ALZHEIMER'S DISEASE, UNSPECIFIED (CODE) (HCC): ICD-10-CM

## 2024-02-28 DIAGNOSIS — R19.5 HEME POSITIVE STOOL: ICD-10-CM

## 2024-02-28 DIAGNOSIS — D64.9 ANEMIA, UNSPECIFIED TYPE: Primary | ICD-10-CM

## 2024-02-28 LAB
BILIRUBIN, URINE, POC: NEGATIVE
BLOOD URINE, POC: NEGATIVE
GLUCOSE URINE, POC: NEGATIVE
HEMOGLOBIN, POC: 8.7 G/DL
KETONES, URINE, POC: NEGATIVE
LEUKOCYTE ESTERASE, URINE, POC: NEGATIVE
NITRITE, URINE, POC: NEGATIVE
PH, URINE, POC: 6.5 (ref 4.6–8)
PROTEIN,URINE, POC: NEGATIVE
SPECIFIC GRAVITY, URINE, POC: >=1.03 (ref 1–1.03)
URINALYSIS CLARITY, POC: CLEAR
URINALYSIS COLOR, POC: YELLOW
UROBILINOGEN, POC: NORMAL

## 2024-02-28 PROCEDURE — G8399 PT W/DXA RESULTS DOCUMENT: HCPCS | Performed by: FAMILY MEDICINE

## 2024-02-28 PROCEDURE — 1036F TOBACCO NON-USER: CPT | Performed by: FAMILY MEDICINE

## 2024-02-28 PROCEDURE — 1090F PRES/ABSN URINE INCON ASSESS: CPT | Performed by: FAMILY MEDICINE

## 2024-02-28 PROCEDURE — G8427 DOCREV CUR MEDS BY ELIG CLIN: HCPCS | Performed by: FAMILY MEDICINE

## 2024-02-28 PROCEDURE — 81003 URINALYSIS AUTO W/O SCOPE: CPT | Performed by: FAMILY MEDICINE

## 2024-02-28 PROCEDURE — 1123F ACP DISCUSS/DSCN MKR DOCD: CPT | Performed by: FAMILY MEDICINE

## 2024-02-28 PROCEDURE — 85018 HEMOGLOBIN: CPT | Performed by: FAMILY MEDICINE

## 2024-02-28 PROCEDURE — G8484 FLU IMMUNIZE NO ADMIN: HCPCS | Performed by: FAMILY MEDICINE

## 2024-02-28 PROCEDURE — 3017F COLORECTAL CA SCREEN DOC REV: CPT | Performed by: FAMILY MEDICINE

## 2024-02-28 PROCEDURE — G8417 CALC BMI ABV UP PARAM F/U: HCPCS | Performed by: FAMILY MEDICINE

## 2024-02-28 PROCEDURE — 3078F DIAST BP <80 MM HG: CPT | Performed by: FAMILY MEDICINE

## 2024-02-28 PROCEDURE — 99214 OFFICE O/P EST MOD 30 MIN: CPT | Performed by: FAMILY MEDICINE

## 2024-02-28 PROCEDURE — 3074F SYST BP LT 130 MM HG: CPT | Performed by: FAMILY MEDICINE

## 2024-02-28 ASSESSMENT — ENCOUNTER SYMPTOMS
WHEEZING: 0
CONSTIPATION: 0
SINUS PAIN: 0
BLOOD IN STOOL: 0
COUGH: 0
CHEST TIGHTNESS: 0
DIARRHEA: 0
ABDOMINAL PAIN: 0
ANAL BLEEDING: 0
EYE DISCHARGE: 0
COUGH: 0
SHORTNESS OF BREATH: 0
SHORTNESS OF BREATH: 1

## 2024-03-01 DIAGNOSIS — R19.5 HEME POSITIVE STOOL: ICD-10-CM

## 2024-03-01 DIAGNOSIS — D64.9 ANEMIA, UNSPECIFIED TYPE: Primary | ICD-10-CM

## 2024-03-05 ENCOUNTER — OFFICE VISIT (OUTPATIENT)
Age: 74
End: 2024-03-05
Payer: MEDICARE

## 2024-03-05 ENCOUNTER — PREP FOR PROCEDURE (OUTPATIENT)
Dept: GASTROENTEROLOGY | Age: 74
End: 2024-03-05

## 2024-03-05 VITALS
SYSTOLIC BLOOD PRESSURE: 126 MMHG | DIASTOLIC BLOOD PRESSURE: 84 MMHG | WEIGHT: 167 LBS | HEIGHT: 59 IN | OXYGEN SATURATION: 98 % | HEART RATE: 78 BPM | BODY MASS INDEX: 33.67 KG/M2

## 2024-03-05 DIAGNOSIS — Z12.11 ENCOUNTER FOR SCREENING COLONOSCOPY: Primary | ICD-10-CM

## 2024-03-05 DIAGNOSIS — D50.9 IRON DEFICIENCY ANEMIA, UNSPECIFIED IRON DEFICIENCY ANEMIA TYPE: Primary | ICD-10-CM

## 2024-03-05 DIAGNOSIS — R19.5 HEME + STOOL: ICD-10-CM

## 2024-03-05 DIAGNOSIS — R19.5 HEME POSITIVE STOOL: ICD-10-CM

## 2024-03-05 PROCEDURE — 99204 OFFICE O/P NEW MOD 45 MIN: CPT | Performed by: PHYSICIAN ASSISTANT

## 2024-03-05 PROCEDURE — 1123F ACP DISCUSS/DSCN MKR DOCD: CPT | Performed by: PHYSICIAN ASSISTANT

## 2024-03-05 PROCEDURE — G8417 CALC BMI ABV UP PARAM F/U: HCPCS | Performed by: PHYSICIAN ASSISTANT

## 2024-03-05 PROCEDURE — 3079F DIAST BP 80-89 MM HG: CPT | Performed by: PHYSICIAN ASSISTANT

## 2024-03-05 PROCEDURE — 3074F SYST BP LT 130 MM HG: CPT | Performed by: PHYSICIAN ASSISTANT

## 2024-03-05 PROCEDURE — G8399 PT W/DXA RESULTS DOCUMENT: HCPCS | Performed by: PHYSICIAN ASSISTANT

## 2024-03-05 PROCEDURE — G8484 FLU IMMUNIZE NO ADMIN: HCPCS | Performed by: PHYSICIAN ASSISTANT

## 2024-03-05 PROCEDURE — 3017F COLORECTAL CA SCREEN DOC REV: CPT | Performed by: PHYSICIAN ASSISTANT

## 2024-03-05 PROCEDURE — 1036F TOBACCO NON-USER: CPT | Performed by: PHYSICIAN ASSISTANT

## 2024-03-05 PROCEDURE — G8427 DOCREV CUR MEDS BY ELIG CLIN: HCPCS | Performed by: PHYSICIAN ASSISTANT

## 2024-03-05 PROCEDURE — 1090F PRES/ABSN URINE INCON ASSESS: CPT | Performed by: PHYSICIAN ASSISTANT

## 2024-03-05 RX ORDER — SODIUM, POTASSIUM,MAG SULFATES 17.5-3.13G
1 SOLUTION, RECONSTITUTED, ORAL ORAL ONCE
Qty: 1 EACH | Refills: 0 | Status: SHIPPED | OUTPATIENT
Start: 2024-03-05 | End: 2024-03-05

## 2024-03-05 RX ORDER — FERROUS SULFATE 325(65) MG
325 TABLET ORAL
COMMUNITY

## 2024-03-05 NOTE — PROGRESS NOTES
mouth every 7 days 12 tablet 3    Multiple Vitamin (MULTIVITAMIN PO) Take by mouth      CALCIUM PO Take by mouth      omeprazole (PRILOSEC) 40 MG delayed release capsule Take 1 capsule by mouth every morning 90 capsule 3    celecoxib (CELEBREX) 200 MG capsule TAKE 1 CAPSULE BY MOUTH WITH FOOD TWICE A DAY FOR 30 DAYS      DULoxetine (CYMBALTA) 60 MG extended release capsule Take 1 capsule by mouth daily TAKE ONE CAPSULE BY MOUTH EVERY DAY      LORazepam (ATIVAN) 0.5 MG tablet Take 1 tablet by mouth.      pregabalin (LYRICA) 25 MG capsule Take 1 capsule by mouth 2 times daily.       No current facility-administered medications for this visit.       Review of Systems  All other systems reviewed and are negative except as noted above.          Objective     Vitals:    03/05/24 1321   BP: 126/84   Pulse: 78   SpO2: 98%       Physical Exam  Vitals reviewed.   Constitutional:       General: She is not in acute distress.     Appearance: She is not ill-appearing, toxic-appearing or diaphoretic.   Eyes:      General: No scleral icterus.  Cardiovascular:      Rate and Rhythm: Normal rate and regular rhythm.      Heart sounds: No murmur heard.  Pulmonary:      Effort: Pulmonary effort is normal. No respiratory distress.      Breath sounds: Normal breath sounds.   Abdominal:      General: There is no distension.      Palpations: Abdomen is soft. There is no mass.      Tenderness: There is abdominal tenderness (epigastric/ruq). There is no guarding or rebound.      Hernia: No hernia is present.   Skin:     General: Skin is warm and dry.      Coloration: Skin is not jaundiced.   Neurological:      General: No focal deficit present.      Mental Status: She is alert.      Comments: Poor memory/recall   Psychiatric:         Mood and Affect: Mood normal.         Behavior: Behavior normal.              Return for scheduled EGD, scheduled colonoscopy, follow-up visit 1-2 weeks after procedure.            An electronic signature was used

## 2024-03-06 RX ORDER — SODIUM CHLORIDE 0.9 % (FLUSH) 0.9 %
5-40 SYRINGE (ML) INJECTION EVERY 12 HOURS SCHEDULED
Status: CANCELLED | OUTPATIENT
Start: 2024-03-06

## 2024-03-06 RX ORDER — SODIUM CHLORIDE 0.9 % (FLUSH) 0.9 %
5-40 SYRINGE (ML) INJECTION PRN
Status: CANCELLED | OUTPATIENT
Start: 2024-03-06

## 2024-03-06 RX ORDER — SODIUM CHLORIDE 9 MG/ML
25 INJECTION, SOLUTION INTRAVENOUS PRN
Status: CANCELLED | OUTPATIENT
Start: 2024-03-06

## 2024-03-20 ENCOUNTER — OFFICE VISIT (OUTPATIENT)
Dept: FAMILY MEDICINE CLINIC | Facility: CLINIC | Age: 74
End: 2024-03-20
Payer: MEDICARE

## 2024-03-20 VITALS
WEIGHT: 165 LBS | HEART RATE: 70 BPM | TEMPERATURE: 97 F | HEIGHT: 59 IN | OXYGEN SATURATION: 100 % | SYSTOLIC BLOOD PRESSURE: 134 MMHG | DIASTOLIC BLOOD PRESSURE: 74 MMHG | BODY MASS INDEX: 33.26 KG/M2

## 2024-03-20 DIAGNOSIS — G30.9 ALZHEIMER'S DISEASE, UNSPECIFIED (CODE) (HCC): ICD-10-CM

## 2024-03-20 DIAGNOSIS — R19.7 DIARRHEA, UNSPECIFIED TYPE: ICD-10-CM

## 2024-03-20 DIAGNOSIS — D64.9 ANEMIA, UNSPECIFIED TYPE: Primary | ICD-10-CM

## 2024-03-20 DIAGNOSIS — I10 PRIMARY HYPERTENSION: ICD-10-CM

## 2024-03-20 LAB — HEMOGLOBIN, POC: 10.5 G/DL

## 2024-03-20 PROCEDURE — 1036F TOBACCO NON-USER: CPT | Performed by: FAMILY MEDICINE

## 2024-03-20 PROCEDURE — 85018 HEMOGLOBIN: CPT | Performed by: FAMILY MEDICINE

## 2024-03-20 PROCEDURE — 99213 OFFICE O/P EST LOW 20 MIN: CPT | Performed by: FAMILY MEDICINE

## 2024-03-20 PROCEDURE — 1090F PRES/ABSN URINE INCON ASSESS: CPT | Performed by: FAMILY MEDICINE

## 2024-03-20 PROCEDURE — G8399 PT W/DXA RESULTS DOCUMENT: HCPCS | Performed by: FAMILY MEDICINE

## 2024-03-20 PROCEDURE — 3017F COLORECTAL CA SCREEN DOC REV: CPT | Performed by: FAMILY MEDICINE

## 2024-03-20 PROCEDURE — G8417 CALC BMI ABV UP PARAM F/U: HCPCS | Performed by: FAMILY MEDICINE

## 2024-03-20 PROCEDURE — G8484 FLU IMMUNIZE NO ADMIN: HCPCS | Performed by: FAMILY MEDICINE

## 2024-03-20 PROCEDURE — 1123F ACP DISCUSS/DSCN MKR DOCD: CPT | Performed by: FAMILY MEDICINE

## 2024-03-20 PROCEDURE — G8427 DOCREV CUR MEDS BY ELIG CLIN: HCPCS | Performed by: FAMILY MEDICINE

## 2024-03-20 PROCEDURE — 3078F DIAST BP <80 MM HG: CPT | Performed by: FAMILY MEDICINE

## 2024-03-20 PROCEDURE — 3075F SYST BP GE 130 - 139MM HG: CPT | Performed by: FAMILY MEDICINE

## 2024-03-20 SDOH — ECONOMIC STABILITY: FOOD INSECURITY: WITHIN THE PAST 12 MONTHS, YOU WORRIED THAT YOUR FOOD WOULD RUN OUT BEFORE YOU GOT MONEY TO BUY MORE.: NEVER TRUE

## 2024-03-20 SDOH — ECONOMIC STABILITY: INCOME INSECURITY: HOW HARD IS IT FOR YOU TO PAY FOR THE VERY BASICS LIKE FOOD, HOUSING, MEDICAL CARE, AND HEATING?: NOT HARD AT ALL

## 2024-03-20 SDOH — ECONOMIC STABILITY: FOOD INSECURITY: WITHIN THE PAST 12 MONTHS, THE FOOD YOU BOUGHT JUST DIDN'T LAST AND YOU DIDN'T HAVE MONEY TO GET MORE.: NEVER TRUE

## 2024-03-20 ASSESSMENT — ENCOUNTER SYMPTOMS
DIARRHEA: 1
SHORTNESS OF BREATH: 0
BLOOD IN STOOL: 0
COUGH: 0
CHEST TIGHTNESS: 0
VOMITING: 0
SINUS PAIN: 0
ABDOMINAL PAIN: 0
NAUSEA: 0
CONSTIPATION: 0
EYE DISCHARGE: 0

## 2024-03-20 ASSESSMENT — PATIENT HEALTH QUESTIONNAIRE - PHQ9: DEPRESSION UNABLE TO ASSESS: FUNCTIONAL CAPACITY MOTIVATION LIMITS ACCURACY

## 2024-03-20 NOTE — PROGRESS NOTES
Dee Dee Corrales is a 73 y.o. female who presents with   Chief Complaint   Patient presents with    Follow-up     Anemia.    Diarrhea       History of Present Illness  Pt with dementia with recent anemia with Hgb 5.3.  Had GI appt and colonoscopy, EGD scheduled for next mo.  Energy improved.  Recne tdiarrhea off and on.  No visible blood in stool.  Taking Fe supplement.      Review of Systems  Review of Systems   Constitutional:  Negative for appetite change, fatigue and fever.   HENT:  Negative for congestion, ear pain and sinus pain.    Eyes:  Negative for discharge.   Respiratory:  Negative for cough, chest tightness and shortness of breath.    Cardiovascular:  Negative for chest pain, palpitations and leg swelling.   Gastrointestinal:  Positive for diarrhea. Negative for abdominal pain, blood in stool, constipation, nausea and vomiting.   Genitourinary:  Negative for dysuria.   Musculoskeletal:  Negative for joint swelling.   Skin:  Negative for rash.   Neurological:  Negative for headaches.   Hematological:  Negative for adenopathy.   Psychiatric/Behavioral:  Negative for dysphoric mood. The patient is not nervous/anxious.         Medications  Current Outpatient Medications   Medication Sig Dispense Refill    ferrous sulfate (IRON 325) 325 (65 Fe) MG tablet Take 1 tablet by mouth daily (with breakfast)      levothyroxine (SYNTHROID) 100 MCG tablet Take 1 tablet by mouth daily 90 tablet 1    memantine (NAMENDA) 10 MG tablet Take 1 tablet by mouth 2 times daily Take half bid for one month than one bid 180 tablet 3    donepezil (ARICEPT) 10 MG tablet Take 1 tablet by mouth nightly 90 tablet 3    alendronate (FOSAMAX) 70 MG tablet Take 1 tablet by mouth every 7 days 12 tablet 3    Multiple Vitamin (MULTIVITAMIN PO) Take by mouth      CALCIUM PO Take by mouth      omeprazole (PRILOSEC) 40 MG delayed release capsule Take 1 capsule by mouth every morning 90 capsule 3    celecoxib (CELEBREX) 200 MG capsule TAKE 1

## 2024-04-04 NOTE — PROGRESS NOTES
Patient's , Edwin Corrales, verified name, , and procedure.    Type: 1a; abbreviated assessment per anesthesia guidelines  Labs per surgeon: none  Labs per anesthesia: none      Instructed pt that they will be notified by the Gi Lab for time of arrival. If any questions please call the GI lab at [phone].    Follow diet and prep instructions per office. May have clear liquids until 4 hours prior to time of arrival.    Bath or shower the night before and the am of surgery with antibacterial soap. No lotions, oils, powders, cologne on skin. No make up, eye make up or jewelry. Wear loose fitting comfortable, clean clothing.     Must have adult present in building the entire time .     Medications for the day of procedure Duloxetine, Synthroid, Namenda, Lyrica    HOLD Celebrex for 5 days prior to procedure.  Hold all vitamins and supplements for 7 days prior to procedure.    The following discharge instructions reviewed with patient: medication given during procedure may cause drowsiness for several hours, therefore, do not drive or operate machinery for remainder of the day, no alcohol on the day of your procedure, resume regular diet and activity unless otherwise directed, for mild sore throat you may use Cepacol throat lozenges or warm salt water gargles as needed, call your physician for any problems or questions. Patient verbalizes understanding.

## 2024-04-09 ENCOUNTER — OFFICE VISIT (OUTPATIENT)
Dept: FAMILY MEDICINE CLINIC | Facility: CLINIC | Age: 74
End: 2024-04-09
Payer: MEDICARE

## 2024-04-09 ENCOUNTER — ANESTHESIA EVENT (OUTPATIENT)
Dept: ENDOSCOPY | Age: 74
End: 2024-04-09
Payer: MEDICARE

## 2024-04-09 VITALS
WEIGHT: 159 LBS | SYSTOLIC BLOOD PRESSURE: 128 MMHG | OXYGEN SATURATION: 98 % | BODY MASS INDEX: 32.05 KG/M2 | DIASTOLIC BLOOD PRESSURE: 80 MMHG | HEART RATE: 86 BPM | HEIGHT: 59 IN | TEMPERATURE: 97.4 F

## 2024-04-09 DIAGNOSIS — G30.9 ALZHEIMER'S DISEASE, UNSPECIFIED (CODE) (HCC): Primary | ICD-10-CM

## 2024-04-09 DIAGNOSIS — D64.9 ANEMIA, UNSPECIFIED TYPE: ICD-10-CM

## 2024-04-09 DIAGNOSIS — I10 PRIMARY HYPERTENSION: ICD-10-CM

## 2024-04-09 DIAGNOSIS — E03.9 ACQUIRED HYPOTHYROIDISM: ICD-10-CM

## 2024-04-09 DIAGNOSIS — R21 RASH: ICD-10-CM

## 2024-04-09 LAB — HEMOGLOBIN, POC: 13.1 G/DL

## 2024-04-09 PROCEDURE — 1123F ACP DISCUSS/DSCN MKR DOCD: CPT | Performed by: FAMILY MEDICINE

## 2024-04-09 PROCEDURE — 3017F COLORECTAL CA SCREEN DOC REV: CPT | Performed by: FAMILY MEDICINE

## 2024-04-09 PROCEDURE — 1090F PRES/ABSN URINE INCON ASSESS: CPT | Performed by: FAMILY MEDICINE

## 2024-04-09 PROCEDURE — 3079F DIAST BP 80-89 MM HG: CPT | Performed by: FAMILY MEDICINE

## 2024-04-09 PROCEDURE — G8427 DOCREV CUR MEDS BY ELIG CLIN: HCPCS | Performed by: FAMILY MEDICINE

## 2024-04-09 PROCEDURE — 43239 EGD BIOPSY SINGLE/MULTIPLE: CPT | Performed by: INTERNAL MEDICINE

## 2024-04-09 PROCEDURE — G8399 PT W/DXA RESULTS DOCUMENT: HCPCS | Performed by: FAMILY MEDICINE

## 2024-04-09 PROCEDURE — 1036F TOBACCO NON-USER: CPT | Performed by: FAMILY MEDICINE

## 2024-04-09 PROCEDURE — G8417 CALC BMI ABV UP PARAM F/U: HCPCS | Performed by: FAMILY MEDICINE

## 2024-04-09 PROCEDURE — 85018 HEMOGLOBIN: CPT | Performed by: FAMILY MEDICINE

## 2024-04-09 PROCEDURE — 99214 OFFICE O/P EST MOD 30 MIN: CPT | Performed by: FAMILY MEDICINE

## 2024-04-09 PROCEDURE — 3074F SYST BP LT 130 MM HG: CPT | Performed by: FAMILY MEDICINE

## 2024-04-09 RX ORDER — CLOTRIMAZOLE AND BETAMETHASONE DIPROPIONATE 10; .64 MG/G; MG/G
CREAM TOPICAL
Qty: 45 G | Refills: 3 | Status: SHIPPED | OUTPATIENT
Start: 2024-04-09

## 2024-04-09 ASSESSMENT — ENCOUNTER SYMPTOMS
EYE DISCHARGE: 0
SHORTNESS OF BREATH: 0
SINUS PAIN: 0
CONSTIPATION: 0
CHEST TIGHTNESS: 0
DIARRHEA: 0
COUGH: 0

## 2024-04-09 ASSESSMENT — PATIENT HEALTH QUESTIONNAIRE - PHQ9: DEPRESSION UNABLE TO ASSESS: FUNCTIONAL CAPACITY MOTIVATION LIMITS ACCURACY

## 2024-04-09 NOTE — PROCEDURES
Endoscopy Note          Operative Report    Patient: Dee Dee Corrales MRN: [id number]      YOB: 1950  Age: 73 y.o.  Sex: female            Indications:  Anemia     Preoperative Evaluation: The patient was evaluated prior to the procedure in the GI lab admission area, the patient ASA was recorded .  Consent was obtained from the patient with the risk of perforation bleeding and aspiration.    Anesthesia: DILMA-per anesthesia  Complications: None  EBL: Unremarkable  Procedure: The patient was sedated in the left lateral decubitus position. Scope was advance from the mouth to the third portion of the duodenum. The scope was withdrawn to the stomach and a refroflexed view was performed.     Findings:  12cm sliding hiatal hernia, no obvious Schatzki's ring  Mild  gastritis- biopsies of the stomach were taken   Normal duodenum- biopsies taken     Postop diagnosis  12cm sliding hiatal hernia, no obvious Schatzki's ring- suspect this is the cause for patients anemia (or largely contributing)   Mild  gastritis- biopsies of the stomach were taken   Normal duodenum- biopsies taken perative Diagnosis:      Recommendations:   Colonoscopy planned for today, if unremarkable, then would consider hiatal hernia repair if ongoing issues with anemia and no other identifiable source.   Follow up path results in 1-2 weeks   Avoid NSAIDs if possible   Start taking over the counter Prilosec 20mg oral daily, indefinitely  Anti-reflux measures     Signed By:  Easton Jack MD     April 10, 2024

## 2024-04-09 NOTE — PROGRESS NOTES
congestion.      Mouth/Throat:      Pharynx: No oropharyngeal exudate or posterior oropharyngeal erythema.   Eyes:      Extraocular Movements: Extraocular movements intact.      Conjunctiva/sclera: Conjunctivae normal.   Cardiovascular:      Rate and Rhythm: Normal rate and regular rhythm.      Heart sounds: Normal heart sounds. No murmur heard.  Pulmonary:      Effort: Pulmonary effort is normal.      Breath sounds: Normal breath sounds. No wheezing.   Musculoskeletal:         General: No tenderness.      Right lower leg: No edema.      Left lower leg: No edema.   Lymphadenopathy:      Cervical: No cervical adenopathy.   Skin:     General: Skin is warm and dry.      Findings: No rash.   Neurological:      General: No focal deficit present.      Mental Status: She is alert.   Psychiatric:         Mood and Affect: Mood normal.         Thought Content: Thought content normal.          Assessment and Plan  Dee Dee was seen today for forms.    Diagnoses and all orders for this visit:    Alzheimer's disease, unspecified    Anemia, unspecified type  -     AMB POC HEMOGLOBIN (HGB)    Primary hypertension    Acquired hypothyroidism    Rash  -     clotrimazole-betamethasone (LOTRISONE) 1-0.05 % cream; Apply topically 2 times daily.    Forms completed  Hgb back to normal- 13.1  Await NANCY thorpe tomorrow  Recheck in 3 mo    On this date I have spent 35 minutes reviewing previous notes, test results and face to face with the patient discussing the diagnosis and importance of compliance with the treatment plan as well as documenting on the day of the visit.

## 2024-04-09 NOTE — H&P
HISTORY AND PHYSICAL             Date: 4/9/2024        Patient Name: Dee Dee Corrales     YOB: 1950      Age:  73 y.o.      History of Present Illness   Anemia     Past Medical History     Past Medical History:   Diagnosis Date    Alzheimer disease (HCC)     Arthritis     osteo    Diarrhea     Fibromyalgia     GERD (gastroesophageal reflux disease)     controlled w/med    Heartburn     History of blood transfusion     Memory loss     Postoperative stiffness of total knee replacement (HCC)     left    Primary hypertension     S/P total knee replacement using cement 8/15/2013    Symptomatic anemia     Thyroid disease     hypothroid.stable w/med        Past Surgical History     Past Surgical History:   Procedure Laterality Date    COLONOSCOPY  9/2015    HYSTERECTOMY (CERVIX STATUS UNKNOWN)  1996    ORTHOPEDIC SURGERY Right     knee replacement    ORTHOPEDIC SURGERY Left 2013    knee replacement    SINUS SURGERY      TOTAL KNEE ARTHROPLASTY  8/2013    left    TOTAL KNEE ARTHROPLASTY  2006    complete Right TKA    TOTAL KNEE ARTHROPLASTY  2003    partial right knee        Medications Prior to Admission     Prior to Admission medications    Medication Sig Start Date End Date Taking? Authorizing Provider   clotrimazole-betamethasone (LOTRISONE) 1-0.05 % cream Apply topically 2 times daily. 4/9/24   Alisha Ricketts MD   ferrous sulfate (IRON 325) 325 (65 Fe) MG tablet Take 1 tablet by mouth daily (with breakfast)    Provider, MD Arnel   levothyroxine (SYNTHROID) 100 MCG tablet Take 1 tablet by mouth daily 12/14/23   Alisha Ricketts MD   memantine (NAMENDA) 10 MG tablet Take 1 tablet by mouth 2 times daily Take half bid for one month than one bid 6/21/23   Micheal Tavera DO   donepezil (ARICEPT) 10 MG tablet Take 1 tablet by mouth nightly 6/21/23   Micheal Tavera DO   alendronate (FOSAMAX) 70 MG tablet Take 1 tablet by mouth every 7 days 3/16/23   Alisha Ricketts MD   Multiple

## 2024-04-09 NOTE — PROCEDURES
Operative Report    Patient: Dee Dee Corrales MRN: [id number]      YOB: 1950  Age: 73 y.o.  Sex: female            Indications:  Anemia     Preoperative Evaluation: The patient was evaluated prior to the procedure in the GI lab admission area, the patient ASA was recorded .  Consent was obtained from the patient with the risk of perforation bleeding and aspiration.    Anesthesia: DILMA-per anesthesia    Complications: None; patient tolerated the procedure well.    EBL -insignificant      Procedure: The patient was sedated in the left lateral decubitus position.  Scope was advanced from the rectum to the cecum.  Evaluation was performed to the cecum twice.  The scope was withdrawn to the rectum, retroflexed view was performed.  The rectal exam was normal.  Preparation was excellent.     Findings:    Sigmoid diverticulosis   NO POLYPS, MASS LESION, OR INFLAMMATION     Postoperative Diagnosis:   DIVERTICULOSIS, OTHERWISE UNREMARKABLE EXAM     Recommendations:   HIGH FIBER DIET       Signed By:  Easton Jack MD     April 10, 2024                           Signed By:  Easton Jack MD     April 9, 2024

## 2024-04-10 ENCOUNTER — ANESTHESIA (OUTPATIENT)
Dept: ENDOSCOPY | Age: 74
End: 2024-04-10
Payer: MEDICARE

## 2024-04-10 ENCOUNTER — HOSPITAL ENCOUNTER (OUTPATIENT)
Age: 74
Setting detail: OUTPATIENT SURGERY
Discharge: HOME | End: 2024-04-10
Attending: INTERNAL MEDICINE | Admitting: INTERNAL MEDICINE
Payer: MEDICARE

## 2024-04-10 VITALS
HEIGHT: 59 IN | WEIGHT: 156.2 LBS | DIASTOLIC BLOOD PRESSURE: 69 MMHG | BODY MASS INDEX: 31.49 KG/M2 | RESPIRATION RATE: 15 BRPM | HEART RATE: 64 BPM | SYSTOLIC BLOOD PRESSURE: 114 MMHG | OXYGEN SATURATION: 97 % | TEMPERATURE: 97.1 F

## 2024-04-10 PROCEDURE — 3609012400 HC EGD TRANSORAL BIOPSY SINGLE/MULTIPLE: Performed by: INTERNAL MEDICINE

## 2024-04-10 PROCEDURE — 3700000001 HC ADD 15 MINUTES (ANESTHESIA): Performed by: INTERNAL MEDICINE

## 2024-04-10 PROCEDURE — 3609027000 HC COLONOSCOPY: Performed by: INTERNAL MEDICINE

## 2024-04-10 PROCEDURE — 2500000003 HC RX 250 WO HCPCS: Performed by: NURSE ANESTHETIST, CERTIFIED REGISTERED

## 2024-04-10 PROCEDURE — A4216 STERILE WATER/SALINE, 10 ML: HCPCS | Performed by: ANESTHESIOLOGY

## 2024-04-10 PROCEDURE — 2709999900 HC NON-CHARGEABLE SUPPLY: Performed by: INTERNAL MEDICINE

## 2024-04-10 PROCEDURE — 2500000003 HC RX 250 WO HCPCS: Performed by: ANESTHESIOLOGY

## 2024-04-10 PROCEDURE — 3700000000 HC ANESTHESIA ATTENDED CARE: Performed by: INTERNAL MEDICINE

## 2024-04-10 PROCEDURE — 7100000010 HC PHASE II RECOVERY - FIRST 15 MIN: Performed by: INTERNAL MEDICINE

## 2024-04-10 PROCEDURE — 6360000002 HC RX W HCPCS: Performed by: NURSE ANESTHETIST, CERTIFIED REGISTERED

## 2024-04-10 PROCEDURE — 88305 TISSUE EXAM BY PATHOLOGIST: CPT

## 2024-04-10 PROCEDURE — 7100000011 HC PHASE II RECOVERY - ADDTL 15 MIN: Performed by: INTERNAL MEDICINE

## 2024-04-10 PROCEDURE — 88312 SPECIAL STAINS GROUP 1: CPT

## 2024-04-10 PROCEDURE — 2580000003 HC RX 258: Performed by: ANESTHESIOLOGY

## 2024-04-10 PROCEDURE — 45378 DIAGNOSTIC COLONOSCOPY: CPT | Performed by: INTERNAL MEDICINE

## 2024-04-10 RX ORDER — SODIUM CHLORIDE 0.9 % (FLUSH) 0.9 %
5-40 SYRINGE (ML) INJECTION PRN
Status: DISCONTINUED | OUTPATIENT
Start: 2024-04-10 | End: 2024-04-10 | Stop reason: HOSPADM

## 2024-04-10 RX ORDER — LIDOCAINE HYDROCHLORIDE 20 MG/ML
INJECTION, SOLUTION EPIDURAL; INFILTRATION; INTRACAUDAL; PERINEURAL PRN
Status: DISCONTINUED | OUTPATIENT
Start: 2024-04-10 | End: 2024-04-10 | Stop reason: SDUPTHER

## 2024-04-10 RX ORDER — SODIUM CHLORIDE, SODIUM LACTATE, POTASSIUM CHLORIDE, CALCIUM CHLORIDE 600; 310; 30; 20 MG/100ML; MG/100ML; MG/100ML; MG/100ML
INJECTION, SOLUTION INTRAVENOUS CONTINUOUS
Status: DISCONTINUED | OUTPATIENT
Start: 2024-04-10 | End: 2024-04-10 | Stop reason: HOSPADM

## 2024-04-10 RX ORDER — SODIUM CHLORIDE 9 MG/ML
25 INJECTION, SOLUTION INTRAVENOUS PRN
Status: DISCONTINUED | OUTPATIENT
Start: 2024-04-10 | End: 2024-04-10 | Stop reason: HOSPADM

## 2024-04-10 RX ORDER — SODIUM CHLORIDE 0.9 % (FLUSH) 0.9 %
5-40 SYRINGE (ML) INJECTION EVERY 12 HOURS SCHEDULED
Status: DISCONTINUED | OUTPATIENT
Start: 2024-04-10 | End: 2024-04-10 | Stop reason: HOSPADM

## 2024-04-10 RX ORDER — PROPOFOL 10 MG/ML
INJECTION, EMULSION INTRAVENOUS PRN
Status: DISCONTINUED | OUTPATIENT
Start: 2024-04-10 | End: 2024-04-10 | Stop reason: SDUPTHER

## 2024-04-10 RX ORDER — LIDOCAINE HYDROCHLORIDE 10 MG/ML
1 INJECTION, SOLUTION INFILTRATION; PERINEURAL
Status: DISCONTINUED | OUTPATIENT
Start: 2024-04-10 | End: 2024-04-10 | Stop reason: HOSPADM

## 2024-04-10 RX ORDER — SODIUM CHLORIDE 9 MG/ML
INJECTION, SOLUTION INTRAVENOUS PRN
Status: DISCONTINUED | OUTPATIENT
Start: 2024-04-10 | End: 2024-04-10 | Stop reason: HOSPADM

## 2024-04-10 RX ADMIN — PROPOFOL 50 MG: 10 INJECTION, EMULSION INTRAVENOUS at 07:04

## 2024-04-10 RX ADMIN — LIDOCAINE HYDROCHLORIDE 50 MG: 20 INJECTION, SOLUTION EPIDURAL; INFILTRATION; INTRACAUDAL; PERINEURAL at 07:04

## 2024-04-10 RX ADMIN — PROPOFOL 140 MCG/KG/MIN: 10 INJECTION, EMULSION INTRAVENOUS at 07:06

## 2024-04-10 RX ADMIN — SODIUM CHLORIDE, POTASSIUM CHLORIDE, SODIUM LACTATE AND CALCIUM CHLORIDE: 600; 310; 30; 20 INJECTION, SOLUTION INTRAVENOUS at 05:55

## 2024-04-10 RX ADMIN — FAMOTIDINE 20 MG: 10 INJECTION, SOLUTION INTRAVENOUS at 06:02

## 2024-04-10 ASSESSMENT — PAIN - FUNCTIONAL ASSESSMENT: PAIN_FUNCTIONAL_ASSESSMENT: 0-10

## 2024-04-10 NOTE — ANESTHESIA PRE PROCEDURE
09:54 AM    CALCIUM 9.1 02/23/2024 09:54 AM    BILITOT 0.4 02/23/2024 09:54 AM    ALKPHOS 87 02/23/2024 09:54 AM    ALKPHOS 85 05/14/2021 09:49 AM    AST 23 02/23/2024 09:54 AM    ALT 51 02/23/2024 09:54 AM       POC Tests: No results for input(s): \"POCGLU\", \"POCNA\", \"POCK\", \"POCCL\", \"POCBUN\", \"POCHEMO\", \"POCHCT\" in the last 72 hours.    Coags: No results found for: \"PROTIME\", \"INR\", \"APTT\"    HCG (If Applicable): No results found for: \"PREGTESTUR\", \"PREGSERUM\", \"HCG\", \"HCGQUANT\"     ABGs: No results found for: \"PHART\", \"PO2ART\", \"ZSA2MHJ\", \"NOO4GMU\", \"BEART\", \"N1ZCZKTX\"     Type & Screen (If Applicable):  No results found for: \"LABABO\", \"LABRH\"    Drug/Infectious Status (If Applicable):  Lab Results   Component Value Date/Time    HEPCAB <0.1 07/17/2019 09:19 AM       COVID-19 Screening (If Applicable):   Lab Results   Component Value Date/Time    COVID19 Not detected 04/25/2021 12:00 PM    COVID19 Please find results under separate order 04/25/2021 12:00 PM    COVID19 Not detected 04/25/2021 12:00 PM           Anesthesia Evaluation  Patient summary reviewed   no history of anesthetic complications:   Airway: Mallampati: II  TM distance: >3 FB   Neck ROM: full  Mouth opening: > = 3 FB   Dental: normal exam         Pulmonary:Negative Pulmonary ROS breath sounds clear to auscultation                             Cardiovascular:  Exercise tolerance: good (>4 METS)  (+) hypertension:        Rhythm: regular  Rate: normal                    Neuro/Psych:   (+) dementia (Alzheimer-- answering questions)             ROS comment: /fN GI/Hepatic/Renal:   (+) GERD: well controlled   Morbid obesity: obese.      ROS comment: Chronic diarrhea  Anemia recently requiring 2U PRBCs, now 8.3  Denies melena.   Endo/Other:    (+) : arthritis:..                 Abdominal:             Vascular:          Other Findings:             Anesthesia Plan      TIVA     ASA 2       Induction: intravenous.      Anesthetic plan and risks

## 2024-04-10 NOTE — PERIOP NOTE
Pt VSS stable. Pain and Nausea controlled at this time. Verbal sign out per MD Flody when pacu care is completed. Plan of care continues.

## 2024-04-10 NOTE — DISCHARGE INSTRUCTIONS
Gastrointestinal Esophagogastroduodenoscopy (EGD) - Upper Exam Discharge Instructions    1. Call Dr. Jack office for any problems or questions.    2. Contact the doctor's office for follow up appointment as directed.    3. Medication may cause drowsiness for several hours, therefore:  Do not drive or operate machinery for remainder of the day.    No alcohol today.  Do not make any important or legal decisions for 24 hours.  Do not sign any legal documents for 24 hours.    5. Ordinarily, you may resume regular diet and activity after exam unless otherwise              specified by your physician.    6. For mild soreness in your throat you may use Cepacol throat lozenges or warm               salt-water gargles as needed.    Any additional instructions:    Postop diagnosis  12cm sliding hiatal hernia- suspect this is the cause for patients anemia (or largely contributing)   Mild  gastritis- biopsies of the stomach were taken   Normal duodenum- biopsies taken perative Diagnosis     Recommendations:   Colonoscopy planned for today, if unremarkable, then would consider hiatal hernia repair if ongoing issues with anemia and no other identifiable source.   Follow up path results in 1-2 weeks   Avoid NSAIDs if possible   Start taking over the counter Prilosec 20mg oral daily, indefinitely  Anti-reflux measures     Gastrointestinal Colonoscopy/Flexible Sigmoidoscopy - Lower Exam Discharge Instructions  Call Dr. Jack office for any problems or questions.  Contact the doctor’s office for follow up appointment as directed  Medication may cause drowsiness for several hours, therefore, do not drive or operate machinery for remainder of the day.  No alcohol today.  Ordinarily, you may resume regular diet and activity after exam unless otherwise specified by your physician.  Because of air put into your colon during exam, you may experience some abdominal distension, relieved by the passage of gas, for several hours.  Contact your

## 2024-04-10 NOTE — PROGRESS NOTES
Pts has dementia and  answered a lot of pre procedure questions, he also states she only did half of the prep and refused the other half . He states she is having yellow liquid stools . Pt refused to let me remove her wedding ring but did let me remove earrings and they were given to the  with her glasses.

## 2024-04-10 NOTE — ANESTHESIA POSTPROCEDURE EVALUATION
Department of Anesthesiology  Postprocedure Note    Patient: Dee Dee Corrales  MRN: [id number]  YOB: 1950  Date of evaluation: 4/10/2024    Procedure Summary       Date: 04/10/24 [place] / [facility] [facility] / [facility]    Anesthesia Start: 0658 Anesthesia Stop: 0738    Procedures:       ESOPHAGOGASTRODUODENOSCOPY BIOPSY (Upper GI Region)      COLORECTAL CANCER SCREENING, NOT HIGH RISK Diagnosis:       Iron deficiency anemia, unspecified      Heme + stool      (Iron deficiency anemia, unspecified [D50.9])      (Heme + stool [R19.5])    Surgeons: Easton Jack MD Responsible Provider: Valery Floyd MD    Anesthesia Type: TIVA ASA Status: 2            Anesthesia Type: No value filed.    Babak Phase I:      Babak Phase II: Babak Score: 8    Anesthesia Post Evaluation    Patient location during evaluation: PACU  Patient participation: complete - patient participated  Level of consciousness: awake and alert  Airway patency: patent  Nausea & Vomiting: no nausea  Cardiovascular status: hemodynamically stable  Respiratory status: acceptable  Hydration status: euvolemic  Pain management: adequate and satisfactory to patient        No notable events documented.

## 2024-04-16 ENCOUNTER — TELEPHONE (OUTPATIENT)
Dept: GASTROENTEROLOGY | Age: 74
End: 2024-04-16

## 2024-04-16 ENCOUNTER — TELEPHONE (OUTPATIENT)
Age: 74
End: 2024-04-16

## 2024-04-16 NOTE — TELEPHONE ENCOUNTER
Please call patient's  to give him information on voicemail you left yesterday.    He didn't completely understand.  [phone]

## 2024-04-16 NOTE — TELEPHONE ENCOUNTER
Called pt with results of EGD/colonoscopy/biopsies per Dr. Jack. No answer, LVM with results and recommendations per MD. Instructed pt to call back at [phone] to schedule a follow up appointment in office with Melissa Grinnell, PA as advised by NORAH Grewal.       ------      Per Dr. Jack: \"Biopsies unrevealing.\"    Date Obtained:   4/10/2024   DIAGNOSIS       A:  \"SMALL BOWEL BIOPSIES\":  UNREMARKABLE SMALL INTESTINAL MUCOSA.        B:  \"STOMACH BIOPSIES\":  SLIGHT CHRONIC GASTRITIS WITHOUT ACTIVITY.     ----    Procedure: The patient was sedated in the left lateral decubitus position. Scope was advance from the mouth to the third portion of the duodenum. The scope was withdrawn to the stomach and a refroflexed view was performed.      Findings:  12cm sliding hiatal hernia, no obvious Schatzki's ring  Mild  gastritis- biopsies of the stomach were taken   Normal duodenum- biopsies taken      Postop diagnosis  12cm sliding hiatal hernia, no obvious Schatzki's ring- suspect this is the cause for patients anemia (or largely contributing)   Mild  gastritis- biopsies of the stomach were taken   Normal duodenum- biopsies taken perative Diagnosis:     Recommendations:   Colonoscopy planned for today, if unremarkable, then would consider hiatal hernia repair if ongoing issues with anemia and no other identifiable source.   Follow up path results in 1-2 weeks   Avoid NSAIDs if possible   Start taking over the counter Prilosec 20mg oral daily, indefinitely  Anti-reflux measures     ----    Procedure: The patient was sedated in the left lateral decubitus position.  Scope was advanced from the rectum to the cecum.  Evaluation was performed to the cecum twice.  The scope was withdrawn to the rectum, retroflexed view was performed.  The rectal exam was normal.  Preparation was excellent.      Findings:    Sigmoid diverticulosis   NO POLYPS, MASS LESION, OR INFLAMMATION      Postoperative Diagnosis:   DIVERTICULOSIS, OTHERWISE

## 2024-04-16 NOTE — TELEPHONE ENCOUNTER
Returned call to pt's , Paul, as requested. Reviewed results and recommendations with Paul per Dr. Jack. Paul verbalized understanding, agreeable to proceed with all recommendations per MD.    Scheduled pt for follow up appt with Melissa Grinnell, PA as advised by PA. Appointment on 5/8/24 at 11:15am. Gave address for [facility] location. Pt verbalized understanding, confirmed that he wrote down appointment info including address.     Instructed Paul to call back with any questions, concerns, or if he needs to reschedule.

## 2024-04-22 ENCOUNTER — OFFICE VISIT (OUTPATIENT)
Dept: NEUROLOGY | Age: 74
End: 2024-04-22
Payer: MEDICARE

## 2024-04-22 DIAGNOSIS — G30.8 SEVERE ALZHEIMER'S DEMENTIA OF OTHER ONSET WITHOUT BEHAVIORAL DISTURBANCE, PSYCHOTIC DISTURBANCE, MOOD DISTURBANCE, OR ANXIETY (HCC): Primary | ICD-10-CM

## 2024-04-22 DIAGNOSIS — F02.C0 SEVERE ALZHEIMER'S DEMENTIA OF OTHER ONSET WITHOUT BEHAVIORAL DISTURBANCE, PSYCHOTIC DISTURBANCE, MOOD DISTURBANCE, OR ANXIETY (HCC): Primary | ICD-10-CM

## 2024-04-22 PROCEDURE — G8427 DOCREV CUR MEDS BY ELIG CLIN: HCPCS | Performed by: PSYCHIATRY & NEUROLOGY

## 2024-04-22 PROCEDURE — 1036F TOBACCO NON-USER: CPT | Performed by: PSYCHIATRY & NEUROLOGY

## 2024-04-22 PROCEDURE — 99214 OFFICE O/P EST MOD 30 MIN: CPT | Performed by: PSYCHIATRY & NEUROLOGY

## 2024-04-22 PROCEDURE — 1090F PRES/ABSN URINE INCON ASSESS: CPT | Performed by: PSYCHIATRY & NEUROLOGY

## 2024-04-22 PROCEDURE — G8417 CALC BMI ABV UP PARAM F/U: HCPCS | Performed by: PSYCHIATRY & NEUROLOGY

## 2024-04-22 PROCEDURE — 1123F ACP DISCUSS/DSCN MKR DOCD: CPT | Performed by: PSYCHIATRY & NEUROLOGY

## 2024-04-22 PROCEDURE — G8399 PT W/DXA RESULTS DOCUMENT: HCPCS | Performed by: PSYCHIATRY & NEUROLOGY

## 2024-04-22 PROCEDURE — 3017F COLORECTAL CA SCREEN DOC REV: CPT | Performed by: PSYCHIATRY & NEUROLOGY

## 2024-04-22 RX ORDER — MEMANTINE HYDROCHLORIDE 10 MG/1
10 TABLET ORAL 2 TIMES DAILY
Qty: 180 TABLET | Refills: 3 | Status: SHIPPED | OUTPATIENT
Start: 2024-04-22

## 2024-04-22 ASSESSMENT — ENCOUNTER SYMPTOMS
GASTROINTESTINAL NEGATIVE: 1
EYES NEGATIVE: 1
RESPIRATORY NEGATIVE: 1
ALLERGIC/IMMUNOLOGIC NEGATIVE: 1

## 2024-04-22 NOTE — PROGRESS NOTES
Extraocular motions are normal.     CN VII   Facial expression full, symmetric.     Motor Exam   Right arm tone: normal  Left arm tone: normal  Right leg tone: normal  Left leg tone: normal    Gait, Coordination, and Reflexes     Gait  Gait: wide-based    Tremor   Resting tremor: absent  Intention tremor: absent  Action tremor: absent    Reflexes   Right brachioradialis: 1+  Left brachioradialis: 1+  Right biceps: 1+  Left biceps: 1+  Right triceps: 1+  Left triceps: 1+  Right patellar: 1+  Left patellar: 1+  Right achilles: 1+  Left achilles: 1+          Assessment   Assessment / Plan:    Diagnoses and all orders for this visit:    Severe Alzheimer's dementia of other onset without behavioral disturbance, psychotic disturbance, mood disturbance, or anxiety (HCC) she has severe memory loss suggesting a form of Alzheimer's type dementia most likely primary progressive aphasia or frontal temporal lobe dementia.  Her initial symptoms were always memory not so much speech.  But now that she is at the point where she is severe she does not offer much information.  Due to her diarrhea workup put on hold her Aricept 10 mg continue Namenda 10 twice a day she is at assisted living and in a couple weeks if they want to restart it they can just let me know and we will take care of it.    Other orders  -     memantine (NAMENDA) 10 MG tablet; Take 1 tablet by mouth 2 times daily Take half bid for one month than one bid        The Diagnosis and differential diagnostic considerations, and Rx Tx were reviewed with the patient at length.           No orders of the defined types were placed in this encounter.         I have spent greater than 50% of visit discussing and counseling of patient  for treatment and diagnostic plan review. Total time30     .      Notes: Patient is to continue all medications as directed by prescribing physicians. Continuations on today's visit are made based on the patient's report of current

## 2024-05-01 ENCOUNTER — TELEPHONE (OUTPATIENT)
Dept: ORTHOPEDIC SURGERY | Age: 74
End: 2024-05-01

## 2024-05-01 NOTE — TELEPHONE ENCOUNTER
called and the patient fell over the weekend on her Rt knee her his bruised and swollen. Patient can barley walk on it. Pt had sx with ROMINA in 2013. Please advise

## 2024-05-01 NOTE — TELEPHONE ENCOUNTER
Confirmed pt had a fall & can weight bear but  is concerned - scheduled appt with the pts  & will call if cancellation comes up this Friday

## 2024-05-08 RX ORDER — DONEPEZIL HYDROCHLORIDE 10 MG/1
10 TABLET, FILM COATED ORAL NIGHTLY
Qty: 90 TABLET | Refills: 3 | OUTPATIENT
Start: 2024-05-08

## 2024-05-08 RX ORDER — DONEPEZIL HYDROCHLORIDE 10 MG/1
10 TABLET, FILM COATED ORAL NIGHTLY
Qty: 90 TABLET | Refills: 3 | Status: SHIPPED | OUTPATIENT
Start: 2024-05-08

## 2024-05-08 NOTE — TELEPHONE ENCOUNTER
Pt requests refill of donepezil 10mg to be sent to [id number] - [place], [place] - [address] - P [phone] - F [phone]

## 2024-05-19 NOTE — PROGRESS NOTES
05/19/24                        Farheen Joyner  [address] [facility] [address]        To Whom It May Concern:        This is to certify Farheen Joyner was evaluated at [facility] in [place] on 05/19/24 and can                [x] Off Work/School/Gym for 1 day           [] return to light work on             [] return to regular work on             [] additional restrictions:             Gautam Gonzalez MD    [facility]  [facility]  [address] [facility] [address]  Dept Phone: [phone]         DULoxetine (CYMBALTA) 60 MG extended release capsule Take 1 capsule by mouth daily TAKE ONE CAPSULE BY MOUTH EVERY DAY      pregabalin (LYRICA) 25 MG capsule Take 1 capsule by mouth 2 times daily.      LORazepam (ATIVAN) 0.5 MG tablet Take 1 tablet by mouth. (Patient not taking: Reported on 12/13/2023)       No current facility-administered medications for this visit.        Past Medical History  Past Medical History:   Diagnosis Date    Arthritis     osteo    Fibromyalgia     GERD (gastroesophageal reflux disease)     controlled w/med    Postoperative stiffness of total knee replacement (HCC)     left    S/P total knee replacement using cement 8/15/2013    Thyroid disease     hypothroid.stable w/med       Surgical History  Past Surgical History:   Procedure Laterality Date    COLONOSCOPY  9/2015    HYSTERECTOMY (CERVIX STATUS UNKNOWN)  1996    ORTHOPEDIC SURGERY Right     knee replacement    ORTHOPEDIC SURGERY Left 2013    knee replacement    TOTAL KNEE ARTHROPLASTY  8/2013    left    TOTAL KNEE ARTHROPLASTY  2006    complete Right TKA    TOTAL KNEE ARTHROPLASTY  2003    partial right knee          Family History  Family History   Problem Relation Age of Onset    Breast Cancer Mother 70    Other Sister         fibromyalgia    Heart Disease Brother     Breast Cancer Paternal Aunt 45    Post-op Cognitive Dysfunction Neg Hx     Emergence Delirium Neg Hx     Post-op Nausea/Vomiting Neg Hx     Delayed Awakening Neg Hx     Pseudochol. Deficiency Neg Hx     Malig Hypertherm Neg Hx        Social History  Social History     Socioeconomic History    Marital status:      Spouse name: Not on file    Number of children: Not on file    Years of education: Not on file    Highest education level: Not on file   Occupational History    Not on file   Tobacco Use    Smoking status: Never    Smokeless tobacco: Never   Vaping Use    Vaping Use: Never used   Substance and Sexual Activity    Alcohol use: No     Alcohol/week: 0.0

## 2024-06-11 ENCOUNTER — TELEPHONE (OUTPATIENT)
Age: 74
End: 2024-06-11

## 2024-10-17 ENCOUNTER — OFFICE VISIT (OUTPATIENT)
Age: 74
End: 2024-10-17
Payer: MEDICARE

## 2024-10-17 VITALS
OXYGEN SATURATION: 98 % | RESPIRATION RATE: 17 BRPM | WEIGHT: 167.8 LBS | DIASTOLIC BLOOD PRESSURE: 68 MMHG | SYSTOLIC BLOOD PRESSURE: 135 MMHG | HEART RATE: 73 BPM | HEIGHT: 59 IN | BODY MASS INDEX: 33.83 KG/M2

## 2024-10-17 DIAGNOSIS — K52.9 CHRONIC DIARRHEA: Primary | ICD-10-CM

## 2024-10-17 PROCEDURE — G8417 CALC BMI ABV UP PARAM F/U: HCPCS | Performed by: PHYSICIAN ASSISTANT

## 2024-10-17 PROCEDURE — 1090F PRES/ABSN URINE INCON ASSESS: CPT | Performed by: PHYSICIAN ASSISTANT

## 2024-10-17 PROCEDURE — 1123F ACP DISCUSS/DSCN MKR DOCD: CPT | Performed by: PHYSICIAN ASSISTANT

## 2024-10-17 PROCEDURE — G8399 PT W/DXA RESULTS DOCUMENT: HCPCS | Performed by: PHYSICIAN ASSISTANT

## 2024-10-17 PROCEDURE — 3078F DIAST BP <80 MM HG: CPT | Performed by: PHYSICIAN ASSISTANT

## 2024-10-17 PROCEDURE — 3075F SYST BP GE 130 - 139MM HG: CPT | Performed by: PHYSICIAN ASSISTANT

## 2024-10-17 PROCEDURE — 3017F COLORECTAL CA SCREEN DOC REV: CPT | Performed by: PHYSICIAN ASSISTANT

## 2024-10-17 PROCEDURE — 1036F TOBACCO NON-USER: CPT | Performed by: PHYSICIAN ASSISTANT

## 2024-10-17 PROCEDURE — 99214 OFFICE O/P EST MOD 30 MIN: CPT | Performed by: PHYSICIAN ASSISTANT

## 2024-10-17 PROCEDURE — G8484 FLU IMMUNIZE NO ADMIN: HCPCS | Performed by: PHYSICIAN ASSISTANT

## 2024-10-17 PROCEDURE — G8427 DOCREV CUR MEDS BY ELIG CLIN: HCPCS | Performed by: PHYSICIAN ASSISTANT

## 2024-10-17 NOTE — PROGRESS NOTES
Dee Dee Corrales (:  1950) is a 74 y.o. female new patient referred to our office for evaluation of the following chief complaint(s):  Follow-up (Pt here today with , pt has been having several diarrhea. On and off for 6 months, getting worse now. )        Assessment & Plan   ASSESSMENT/PLAN:  1. Chronic diarrhea  -     Occult Blood, Fecal; Future  -     C. difficile toxin Molecular; Future  -     MISCELLANEOUS SENDOUT fecal elastase; Future  -     Culture, Stool; Future       -6 month hx of diarrhea worsening over recent months with frequent postprandial diarrhea/incontinence at [facility]. Patient with dementia so unable to provide full history but no known hx of abdominal pain, melena, hematochezia. Previous anemia has normalized on recent labs but has a hx of heme-positive stool.   -Check FOBT and stool studies to r/o infectious diarrhea and EPI. If negative will treat presumptively for lymphocytic colitis with Budesonide as she has a history in . At colonoscopy in April for anemia random colon biopsies were not obtained. In the meantime avoid stool softeners, Miralax, etc. Can utilize Imodium 2 mg QID PRN. Avoid common triggers for diarrhea including caffeine, nicotine, foods high in fat, sugar, artificial sweeteners.     Subjective   SUBJECTIVE/OBJECTIVE  Dee Dee Corrales is a 74 y.o. year old female with PMH pertinent for  HTN, GERD, fibromyalgia, lymphocytic colitis, Alzheimer's disease, hypothyroidism, osteoporosis. Surgical history is pertinent for hysterectomy.     Pertinent evaluation to-date includes:   -EGD and colonoscopy 2015 (Dr. Powell): Random duodenal biopsies unremarkable, random colon biopsies consistent with lymphocytic colitis; colonoscopy recall 10 years   -EGD 2024 (Dr. Jack): 12 cm sliding hiatal hernia, mild gastritis, normal duodenum  -Colonoscopy 2024 (Dr. Jack): excellent prep, sigmoid diverticulosis, otherwise normal    Patient was initially evaluated in my

## 2024-10-24 DIAGNOSIS — K52.9 CHRONIC DIARRHEA: ICD-10-CM

## 2024-10-24 LAB
C. DIFFICILE TOXIN MOLECULAR: NEGATIVE
HEMOCCULT STL QL: NEGATIVE

## 2024-10-27 LAB
BACTERIA SPEC CULT: NORMAL
SERVICE CMNT-IMP: NORMAL

## 2024-10-28 ENCOUNTER — TELEPHONE (OUTPATIENT)
Age: 74
End: 2024-10-28

## 2024-10-28 LAB — ELASTASE PANC STL-MCNT: 661 UG ELAST./G

## 2024-10-28 NOTE — TELEPHONE ENCOUNTER
Talked to pt's . Gave message from ehsan to give to Ms. Dee Dee. Pt's  verbalized understanding with no  other questions or concerns.

## 2024-11-13 ENCOUNTER — TELEPHONE (OUTPATIENT)
Age: 74
End: 2024-11-13

## 2024-11-13 NOTE — TELEPHONE ENCOUNTER
----- Message from Melissa Grinnell, PA-C sent at 11/11/2024  8:37 PM EST -----  Will you check on this patient - at office visit we discussed trial of budesonide for her hx of microscopic colitis if her stool cultures were negative. If she is still having frequent diarrhea please let me know and I will initiate a budesonide taper.

## 2025-01-22 ENCOUNTER — OFFICE VISIT (OUTPATIENT)
Dept: NEUROLOGY | Age: 75
End: 2025-01-22
Payer: MEDICARE

## 2025-01-22 VITALS — HEIGHT: 59 IN | BODY MASS INDEX: 33.87 KG/M2

## 2025-01-22 DIAGNOSIS — F02.C18 SEVERE ALZHEIMER'S DEMENTIA WITH OTHER BEHAVIORAL DISTURBANCE, UNSPECIFIED TIMING OF DEMENTIA ONSET (HCC): Primary | ICD-10-CM

## 2025-01-22 DIAGNOSIS — G30.9 SEVERE ALZHEIMER'S DEMENTIA WITH OTHER BEHAVIORAL DISTURBANCE, UNSPECIFIED TIMING OF DEMENTIA ONSET (HCC): Primary | ICD-10-CM

## 2025-01-22 PROCEDURE — 1159F MED LIST DOCD IN RCRD: CPT | Performed by: PSYCHIATRY & NEUROLOGY

## 2025-01-22 PROCEDURE — 1160F RVW MEDS BY RX/DR IN RCRD: CPT | Performed by: PSYCHIATRY & NEUROLOGY

## 2025-01-22 PROCEDURE — 99214 OFFICE O/P EST MOD 30 MIN: CPT | Performed by: PSYCHIATRY & NEUROLOGY

## 2025-01-22 PROCEDURE — 1123F ACP DISCUSS/DSCN MKR DOCD: CPT | Performed by: PSYCHIATRY & NEUROLOGY

## 2025-01-22 ASSESSMENT — ENCOUNTER SYMPTOMS
ALLERGIC/IMMUNOLOGIC NEGATIVE: 1
GASTROINTESTINAL NEGATIVE: 1
EYES NEGATIVE: 1
RESPIRATORY NEGATIVE: 1

## 2025-01-22 NOTE — PROGRESS NOTES
[facility]  [address] [place] [address], [address]  [place] [address]  Phone: [phone] Fax [phone]  Dr. Micheal Tavera      1/22/2025  Dee Dee Corrales     Patient is referred by the following provider for consultation regarding as below:       I reviewed the available records and notes and have examined patient with the following findings:     Chief Complaint:  Chief Complaint   Patient presents with    Follow-up          HPI: This is a right handed 74 y.o.  female here with her .  And her  recently put her into assisted living prior to last visit.  And she did not even know she was in there she has such severe advanced dementia.  She does have what we thought was primary progressive aphasia but it could be a variant of Alzheimer's disease she cannot have any expressive component to her speech she does not follow any commands.  Her MoCA 0 out of 30 she is pleasantly demented.  For the most part unless her dependence needs to be changed she needs to be showered cleaned or changing clothing and then she gets very argumentative or frustrated.  Patient is otherwise very stable comfortable and doing well.  Her  would like to leave the area for a week and so we did give him some information about some caregivers that could go into the assisted living and help.  I do not know if he will go that route.  But when it comes to her activities of daily living like showering bathing and changing her depends she gets very anxious.  Her MoCA test is 0 out of 30    IMAGING REVIEW:  I REVIEWED PERTINENT  IMAGES AND REPORTS WITH THE PATIENT PERSONALLY, DIRECTLY AND FULLY.     Past Medical History:  Past Medical History:   Diagnosis Date    Alzheimer disease (HCC)     Arthritis     osteo    Diarrhea     Fibromyalgia     GERD (gastroesophageal reflux disease)     controlled w/med    Heartburn     History of blood transfusion     Memory loss     Postoperative stiffness of total knee

## (undated) DEVICE — ENDOSCOPIC KIT 1.1+ OP4 CA DE 2 GWN AAMI LEVEL 3

## (undated) DEVICE — YANKAUER,BULB TIP,W/O VENT,RIGID,STERILE: Brand: MEDLINE

## (undated) DEVICE — SYRINGE MED 10ML LUERLOCK TIP W/O SFTY DISP

## (undated) DEVICE — MOUTHPIECE ENDOSCP L CTRL OPN AND SIDE PORTS DISP

## (undated) DEVICE — BLOCK BITE AD 60FR W/ VELC STRP ADDRESSES MOST PT AND

## (undated) DEVICE — AIRLIFE™ OXYGEN TUBING 7 FEET (2.1 M) CRUSH RESISTANT OXYGEN TUBING, VINYL TIPPED: Brand: AIRLIFE™

## (undated) DEVICE — SYRINGE, LUER SLIP, STERILE, 60ML: Brand: MEDLINE

## (undated) DEVICE — CONTAINER FORMALIN PREFILLED 10% NBF 60ML

## (undated) DEVICE — CONNECTOR TBNG OD5-7MM O2 END DISP

## (undated) DEVICE — CANNULA NSL ORAL AD FOR CAPNOFLEX CO2 O2 AIRLFE

## (undated) DEVICE — FORCEPS BX OVL CUP SERR DISP CAP L 240CM RAD JAW 4

## (undated) DEVICE — GAUZE,SPONGE,4"X4",12PLY,WOVEN,NS,LF: Brand: MEDLINE

## (undated) DEVICE — SYRINGE MEDICAL 3ML CLEAR PLASTIC STANDARD NON CONTROL LUERLOCK TIP DISPOSABLE

## (undated) DEVICE — SINGLE PORT MANIFOLD: Brand: NEPTUNE 2

## (undated) DEVICE — KENDALL RADIOLUCENT FOAM MONITORING ELECTRODE RECTANGULAR SHAPE: Brand: KENDALL

## (undated) DEVICE — NEEDLE SYR 18GA L1.5IN RED PLAS HUB S STL BLNT FILL W/O